# Patient Record
Sex: MALE | Race: WHITE | NOT HISPANIC OR LATINO | URBAN - METROPOLITAN AREA
[De-identification: names, ages, dates, MRNs, and addresses within clinical notes are randomized per-mention and may not be internally consistent; named-entity substitution may affect disease eponyms.]

---

## 2019-02-02 ENCOUNTER — INPATIENT (INPATIENT)
Facility: HOSPITAL | Age: 54
LOS: 0 days | Discharge: HOME | End: 2019-02-03
Attending: FAMILY MEDICINE | Admitting: FAMILY MEDICINE
Payer: COMMERCIAL

## 2019-02-02 VITALS
RESPIRATION RATE: 18 BRPM | DIASTOLIC BLOOD PRESSURE: 79 MMHG | HEIGHT: 70 IN | TEMPERATURE: 98 F | OXYGEN SATURATION: 96 % | WEIGHT: 184.97 LBS | HEART RATE: 69 BPM | SYSTOLIC BLOOD PRESSURE: 109 MMHG

## 2019-02-02 LAB
ALBUMIN SERPL ELPH-MCNC: 3.6 G/DL — SIGNIFICANT CHANGE UP (ref 3.3–5)
ALP SERPL-CCNC: 62 U/L — SIGNIFICANT CHANGE UP (ref 40–120)
ALT FLD-CCNC: 25 U/L — SIGNIFICANT CHANGE UP (ref 12–78)
ANION GAP SERPL CALC-SCNC: 5 MMOL/L — SIGNIFICANT CHANGE UP (ref 5–17)
APTT BLD: 28.9 SEC — SIGNIFICANT CHANGE UP (ref 27.5–36.3)
AST SERPL-CCNC: 22 U/L — SIGNIFICANT CHANGE UP (ref 15–37)
BASOPHILS # BLD AUTO: 0.06 K/UL — SIGNIFICANT CHANGE UP (ref 0–0.2)
BASOPHILS NFR BLD AUTO: 0.9 % — SIGNIFICANT CHANGE UP (ref 0–2)
BILIRUB SERPL-MCNC: 0.4 MG/DL — SIGNIFICANT CHANGE UP (ref 0.2–1.2)
BUN SERPL-MCNC: 24 MG/DL — HIGH (ref 7–23)
CALCIUM SERPL-MCNC: 9.2 MG/DL — SIGNIFICANT CHANGE UP (ref 8.5–10.1)
CHLORIDE SERPL-SCNC: 109 MMOL/L — HIGH (ref 96–108)
CO2 SERPL-SCNC: 25 MMOL/L — SIGNIFICANT CHANGE UP (ref 22–31)
CREAT SERPL-MCNC: 1.52 MG/DL — HIGH (ref 0.5–1.3)
EOSINOPHIL # BLD AUTO: 0.49 K/UL — SIGNIFICANT CHANGE UP (ref 0–0.5)
EOSINOPHIL NFR BLD AUTO: 7.5 % — HIGH (ref 0–6)
GLUCOSE SERPL-MCNC: 100 MG/DL — HIGH (ref 70–99)
HCT VFR BLD CALC: 42 % — SIGNIFICANT CHANGE UP (ref 39–50)
HGB BLD-MCNC: 14.6 G/DL — SIGNIFICANT CHANGE UP (ref 13–17)
IMM GRANULOCYTES NFR BLD AUTO: 0.8 % — SIGNIFICANT CHANGE UP (ref 0–1.5)
INR BLD: 1.04 RATIO — SIGNIFICANT CHANGE UP (ref 0.88–1.16)
LIDOCAIN IGE QN: 231 U/L — SIGNIFICANT CHANGE UP (ref 73–393)
LYMPHOCYTES # BLD AUTO: 1.87 K/UL — SIGNIFICANT CHANGE UP (ref 1–3.3)
LYMPHOCYTES # BLD AUTO: 28.5 % — SIGNIFICANT CHANGE UP (ref 13–44)
MAGNESIUM SERPL-MCNC: 1.9 MG/DL — SIGNIFICANT CHANGE UP (ref 1.6–2.6)
MCHC RBC-ENTMCNC: 31.3 PG — SIGNIFICANT CHANGE UP (ref 27–34)
MCHC RBC-ENTMCNC: 34.8 GM/DL — SIGNIFICANT CHANGE UP (ref 32–36)
MCV RBC AUTO: 90.1 FL — SIGNIFICANT CHANGE UP (ref 80–100)
MONOCYTES # BLD AUTO: 0.66 K/UL — SIGNIFICANT CHANGE UP (ref 0–0.9)
MONOCYTES NFR BLD AUTO: 10.1 % — SIGNIFICANT CHANGE UP (ref 2–14)
NEUTROPHILS # BLD AUTO: 3.43 K/UL — SIGNIFICANT CHANGE UP (ref 1.8–7.4)
NEUTROPHILS NFR BLD AUTO: 52.2 % — SIGNIFICANT CHANGE UP (ref 43–77)
NRBC # BLD: 0 /100 WBCS — SIGNIFICANT CHANGE UP (ref 0–0)
PLATELET # BLD AUTO: 207 K/UL — SIGNIFICANT CHANGE UP (ref 150–400)
POTASSIUM SERPL-MCNC: 3.9 MMOL/L — SIGNIFICANT CHANGE UP (ref 3.5–5.3)
POTASSIUM SERPL-SCNC: 3.9 MMOL/L — SIGNIFICANT CHANGE UP (ref 3.5–5.3)
PROT SERPL-MCNC: 7.3 GM/DL — SIGNIFICANT CHANGE UP (ref 6–8.3)
PROTHROM AB SERPL-ACNC: 11.6 SEC — SIGNIFICANT CHANGE UP (ref 10–12.9)
RBC # BLD: 4.66 M/UL — SIGNIFICANT CHANGE UP (ref 4.2–5.8)
RBC # FLD: 12.2 % — SIGNIFICANT CHANGE UP (ref 10.3–14.5)
SODIUM SERPL-SCNC: 139 MMOL/L — SIGNIFICANT CHANGE UP (ref 135–145)
TROPONIN I SERPL-MCNC: <0.015 NG/ML — SIGNIFICANT CHANGE UP (ref 0.01–0.04)
WBC # BLD: 6.56 K/UL — SIGNIFICANT CHANGE UP (ref 3.8–10.5)
WBC # FLD AUTO: 6.56 K/UL — SIGNIFICANT CHANGE UP (ref 3.8–10.5)

## 2019-02-02 PROCEDURE — 99285 EMERGENCY DEPT VISIT HI MDM: CPT

## 2019-02-02 PROCEDURE — 71045 X-RAY EXAM CHEST 1 VIEW: CPT | Mod: 26

## 2019-02-02 PROCEDURE — 93010 ELECTROCARDIOGRAM REPORT: CPT

## 2019-02-02 PROCEDURE — 71275 CT ANGIOGRAPHY CHEST: CPT | Mod: 26

## 2019-02-02 RX ORDER — SODIUM CHLORIDE 9 MG/ML
1000 INJECTION INTRAMUSCULAR; INTRAVENOUS; SUBCUTANEOUS ONCE
Qty: 0 | Refills: 0 | Status: COMPLETED | OUTPATIENT
Start: 2019-02-02 | End: 2019-02-02

## 2019-02-02 RX ADMIN — SODIUM CHLORIDE 1000 MILLILITER(S): 9 INJECTION INTRAMUSCULAR; INTRAVENOUS; SUBCUTANEOUS at 23:39

## 2019-02-02 RX ADMIN — SODIUM CHLORIDE 1000 MILLILITER(S): 9 INJECTION INTRAMUSCULAR; INTRAVENOUS; SUBCUTANEOUS at 22:38

## 2019-02-02 NOTE — ED PROVIDER NOTE - OBJECTIVE STATEMENT
55 y/o male with a PMHx of acid reflux presents to the ED c/o chest pressure today. Pt reports he was drinking coffee and watching a movie, felt acid reflux symptoms, took an antacids with sx relief. Then, the symptoms came back after 15 minutes and were much worse, with diaphoresis. Pt says "I didn't feel right". EMS gave patient 325 mg PO ASA and Nitro sublingual x 3. Pt reports having similar symptoms 5 years ago. No recent long travel.

## 2019-02-02 NOTE — ED PROVIDER NOTE - MEDICAL DECISION MAKING DETAILS
EKG sinus bradycardia.  CXR WNL.  CTA chest with small pulmonary nodule, otherwise normal.  Initial Troponin undetectable.  Pt heart rate down to mid 40's, which is new for patient.  Is not on any beta-blockade.  Plan for admission, serial enzymes, cardiology consultation.

## 2019-02-02 NOTE — ED ADULT TRIAGE NOTE - CHIEF COMPLAINT QUOTE
Patient states he had 7/10 epigastric CP unrelieved by antacids. EMS gave patient 325 mg PO ASA and Nitro sublingual x 3. Patient states pain improved to 3/10. EKG shown to MD Hirsch.

## 2019-02-02 NOTE — ED ADULT NURSE NOTE - OBJECTIVE STATEMENT
BIBA c/o mid sternal chest pressure & discomfort starting around 1930. Pt reports taking tums with some relief. But chest pressure returned shortly after with some nausea. Denies vomiting. Denies SOB. A&OX4. No distress noted at this time. Has nitro & ASA with EMS prior to ER as per Pt.

## 2019-02-02 NOTE — ED ADULT NURSE NOTE - NSIMPLEMENTINTERV_GEN_ALL_ED
Implemented All Universal Safety Interventions:  [place] to call system. Call bell, personal items and telephone within reach. Instruct patient to call for assistance. Room bathroom lighting operational. Non-slip footwear when patient is off stretcher. Physically safe environment: no spills, clutter or unnecessary equipment. Stretcher in lowest position, wheels locked, appropriate side rails in place.

## 2019-02-03 ENCOUNTER — TRANSCRIPTION ENCOUNTER (OUTPATIENT)
Age: 54
End: 2019-02-03

## 2019-02-03 VITALS
SYSTOLIC BLOOD PRESSURE: 120 MMHG | HEART RATE: 76 BPM | OXYGEN SATURATION: 97 % | RESPIRATION RATE: 17 BRPM | DIASTOLIC BLOOD PRESSURE: 61 MMHG | TEMPERATURE: 97 F

## 2019-02-03 LAB
ADD ON TEST-SPECIMEN IN LAB: SIGNIFICANT CHANGE UP
ANION GAP SERPL CALC-SCNC: 4 MMOL/L — LOW (ref 5–17)
BUN SERPL-MCNC: 21 MG/DL — SIGNIFICANT CHANGE UP (ref 7–23)
CALCIUM SERPL-MCNC: 8.9 MG/DL — SIGNIFICANT CHANGE UP (ref 8.5–10.1)
CHLORIDE SERPL-SCNC: 112 MMOL/L — HIGH (ref 96–108)
CHOLEST SERPL-MCNC: 133 MG/DL — SIGNIFICANT CHANGE UP (ref 10–199)
CO2 SERPL-SCNC: 29 MMOL/L — SIGNIFICANT CHANGE UP (ref 22–31)
CREAT SERPL-MCNC: 1.48 MG/DL — HIGH (ref 0.5–1.3)
GLUCOSE SERPL-MCNC: 78 MG/DL — SIGNIFICANT CHANGE UP (ref 70–99)
HCV AB S/CO SERPL IA: 0.15 S/CO — SIGNIFICANT CHANGE UP
HCV AB SERPL-IMP: SIGNIFICANT CHANGE UP
HDLC SERPL-MCNC: 49 MG/DL — SIGNIFICANT CHANGE UP
LIPID PNL WITH DIRECT LDL SERPL: 72 MG/DL — SIGNIFICANT CHANGE UP
POTASSIUM SERPL-MCNC: 4.6 MMOL/L — SIGNIFICANT CHANGE UP (ref 3.5–5.3)
POTASSIUM SERPL-SCNC: 4.6 MMOL/L — SIGNIFICANT CHANGE UP (ref 3.5–5.3)
SODIUM SERPL-SCNC: 145 MMOL/L — SIGNIFICANT CHANGE UP (ref 135–145)
TOTAL CHOLESTEROL/HDL RATIO MEASUREMENT: 2.7 RATIO — LOW (ref 3.4–9.6)
TRIGL SERPL-MCNC: 60 MG/DL — SIGNIFICANT CHANGE UP (ref 10–149)
TROPONIN I SERPL-MCNC: <0.015 NG/ML — SIGNIFICANT CHANGE UP (ref 0.01–0.04)
TROPONIN I SERPL-MCNC: <0.015 NG/ML — SIGNIFICANT CHANGE UP (ref 0.01–0.04)
TSH SERPL-MCNC: 1.72 UU/ML — SIGNIFICANT CHANGE UP (ref 0.34–4.82)

## 2019-02-03 PROCEDURE — 93010 ELECTROCARDIOGRAM REPORT: CPT

## 2019-02-03 RX ORDER — FAMOTIDINE 10 MG/ML
1 INJECTION INTRAVENOUS
Qty: 60 | Refills: 0
Start: 2019-02-03 | End: 2019-03-04

## 2019-02-03 RX ORDER — ASPIRIN/CALCIUM CARB/MAGNESIUM 324 MG
325 TABLET ORAL DAILY
Qty: 0 | Refills: 0 | Status: DISCONTINUED | OUTPATIENT
Start: 2019-02-03 | End: 2019-02-03

## 2019-02-03 RX ORDER — METOPROLOL TARTRATE 50 MG
0.5 TABLET ORAL
Qty: 15 | Refills: 0
Start: 2019-02-03 | End: 2019-03-04

## 2019-02-03 RX ORDER — ASPIRIN/CALCIUM CARB/MAGNESIUM 324 MG
1 TABLET ORAL
Qty: 30 | Refills: 0
Start: 2019-02-03 | End: 2019-03-04

## 2019-02-03 RX ORDER — ACETAMINOPHEN 500 MG
650 TABLET ORAL EVERY 6 HOURS
Qty: 0 | Refills: 0 | Status: DISCONTINUED | OUTPATIENT
Start: 2019-02-03 | End: 2019-02-03

## 2019-02-03 RX ORDER — ONDANSETRON 8 MG/1
4 TABLET, FILM COATED ORAL EVERY 6 HOURS
Qty: 0 | Refills: 0 | Status: DISCONTINUED | OUTPATIENT
Start: 2019-02-03 | End: 2019-02-03

## 2019-02-03 RX ORDER — SODIUM CHLORIDE 9 MG/ML
3 INJECTION INTRAMUSCULAR; INTRAVENOUS; SUBCUTANEOUS ONCE
Qty: 0 | Refills: 0 | Status: COMPLETED | OUTPATIENT
Start: 2019-02-03 | End: 2019-02-03

## 2019-02-03 RX ORDER — ATORVASTATIN CALCIUM 80 MG/1
20 TABLET, FILM COATED ORAL AT BEDTIME
Qty: 0 | Refills: 0 | Status: DISCONTINUED | OUTPATIENT
Start: 2019-02-03 | End: 2019-02-03

## 2019-02-03 RX ORDER — ATORVASTATIN CALCIUM 80 MG/1
1 TABLET, FILM COATED ORAL
Qty: 30 | Refills: 0
Start: 2019-02-03 | End: 2019-03-04

## 2019-02-03 RX ORDER — ENOXAPARIN SODIUM 100 MG/ML
40 INJECTION SUBCUTANEOUS EVERY 24 HOURS
Qty: 0 | Refills: 0 | Status: DISCONTINUED | OUTPATIENT
Start: 2019-02-03 | End: 2019-02-03

## 2019-02-03 RX ADMIN — Medication 325 MILLIGRAM(S): at 11:41

## 2019-02-03 RX ADMIN — SODIUM CHLORIDE 3 MILLILITER(S): 9 INJECTION INTRAMUSCULAR; INTRAVENOUS; SUBCUTANEOUS at 00:27

## 2019-02-03 RX ADMIN — ENOXAPARIN SODIUM 40 MILLIGRAM(S): 100 INJECTION SUBCUTANEOUS at 05:36

## 2019-02-03 NOTE — DISCHARGE NOTE ADULT - PATIENT PORTAL LINK FT
You can access the [facility] Portal, offered by [facility], by registering with the following website: [email or web address]

## 2019-02-03 NOTE — DISCHARGE NOTE ADULT - MEDICATION SUMMARY - MEDICATIONS TO TAKE
I will START or STAY ON the medications listed below when I get home from the hospital:    aspirin 81 mg oral tablet  -- 1 tab(s) by mouth once a day  -- Indication: For Chest pain    atorvastatin 20 mg oral tablet  -- 1 tab(s) by mouth once a day (at bedtime)  -- Indication: For Chest pain    Metoprolol Succinate ER 25 mg oral tablet, extended release  -- 0.5 tab(s) by mouth once a day   -- It is very important that you take or use this exactly as directed.  Do not skip doses or discontinue unless directed by your doctor.  May cause drowsiness.  Alcohol may intensify this effect.  Use care when operating dangerous machinery.  Some non-prescription drugs may aggravate your condition.  Read all labels carefully.  If a warning appears, check with your doctor before taking.  Swallow whole.  Do not crush.  Take with food or milk.  This drug may impair the ability to drive or operate machinery.  Use care until you become familiar with its effects.    -- Indication: For Chest pain    Pepcid 20 mg oral tablet  -- 1 tab(s) by mouth 2 times a day   -- It is very important that you take or use this exactly as directed.  Do not skip doses or discontinue unless directed by your doctor.  Obtain medical advice before taking any non-prescription drugs as some may affect the action of this medication.    -- Indication: For gerd

## 2019-02-03 NOTE — H&P ADULT - HISTORY OF PRESENT ILLNESS
53 y/o male with a PMHx of acid reflux presents to the ED c/o chest pressure today. Pt reports he was drinking coffee and watching a movie, felt acid reflux symptoms, took an antacids with sx relief. Then, the symptoms came back after 15 minutes and were much worse, with diaphoresis. Pt says "I didn't feel right". EMS gave patient 325 mg PO ASA and Nitro sublingual x 3. Pt reports having similar symptoms 5 years ago. No recent long travel. Pt is a  53 y/o male with a PMHx of acid reflux  who presents via EMS to the ED c/o chest pressure today. Pt reports he was drinking coffee and watching a movie, felt acid reflux symptoms, took an antacid with some relief.    Then, the symptoms came back after 15 minutes and were much worse, with diaphoresis.  Pt reports having similar symptoms 5 years ago.    Pt said to ED staff,  "I didn't feel right".      EMS gave patient 325 mg PO ASA and Nitro sublingual x 3,  following this pts blood pressure went down to 100s systolic.  In the ED , pt was noted to be bradycardic to 45 on telemetry. Pt is a  55 y/o gentleman with  PMHx of acid reflux, partial parathyroidectomy for nephrolithiasis due to hypercalcemia, remote food borne Hepatitis infection  who presents via EMS c/o chest pressure while watching a movie and sipping coffee at 8pm.    Pt reports he initially  felt acid reflux symptoms, and took a Tagament antacid, then took Tums  with some relief.    He felt the symptoms came back after 15 minutes and increased from 5/10 to 9/10, with diaphoresis and a feeling of discomfort. ( Pt reports having similar symptoms 5 years ago,  at which time he had a neg nuclear stress test).  Pt took 4 baby ASA.  He looked grey and his systolic bp was 160s.   Once EMS arrived they gave sublingual nitroglycerin  x 3.   Following this,  pt reports his chest pressure improved and is now 1/10.   Pts blood pressure went down to 90s systolic.  In the ED , pt was noted to be bradycardic to 45 on telemetry.     Pt reports he drinks 4-5 cups of coffee/day    Fam Hx:  brother DM at 58  Father DM, Parkinsons, Bypass at age 63,  at 88 of lung ca  Mother is 82

## 2019-02-03 NOTE — DISCHARGE NOTE ADULT - CARE PROVIDERS DIRECT ADDRESSES
,DirectAddress_Unknown,[email or web address]

## 2019-02-03 NOTE — DISCHARGE NOTE ADULT - CARE PROVIDER_API CALL
Raquel Lange (MD)  Cardiology; Interventional Cardiology  [address] [address] [facility] [address], [place] [address]  Phone: [phone]  Fax: [phone]    Oleg Stein)  Internal Medicine  [address] [place] [address]  Phone: [phone]  Fax: [phone]

## 2019-02-03 NOTE — H&P ADULT - NSHPPHYSICALEXAM_GEN_ALL_CORE
ICU Vital Signs Last 24 Hrs    T(F): 98.2 (02 Feb 2019 22:37), Max: 98.5 (02 Feb 2019 20:45)  HR: 51 (02 Feb 2019 22:37) (51 - 69)  BP: 105/64 (02 Feb 2019 22:37) (105/64 - 109/79)    RR: 14 (02 Feb 2019 22:37) (14 - 18)  SpO2: 97% (02 Feb 2019 22:37) (96% - 97%)

## 2019-02-03 NOTE — DISCHARGE NOTE ADULT - PLAN OF CARE
resolution -Continue ASA, metoprolol, statin  -Follow up with Cardiology in AM -Pepcid 20mg twice a day -Incidental finding 4mm nodule in Right upper lobe on CT scan  -Recommend repeat CT scan in 1 year  -Follow up with PCP

## 2019-02-03 NOTE — DISCHARGE NOTE ADULT - CARE PLAN
Principal Discharge DX:	Chest pain  Goal:	resolution  Assessment and plan of treatment:	-Continue ASA, metoprolol, statin  -Follow up with Cardiology in AM  Secondary Diagnosis:	Acid reflux  Assessment and plan of treatment:	-Pepcid 20mg twice a day  Secondary Diagnosis:	Pulmonary nodule  Assessment and plan of treatment:	-Incidental finding 4mm nodule in Right upper lobe on CT scan  -Recommend repeat CT scan in 1 year  -Follow up with PCP

## 2019-02-03 NOTE — H&P ADULT - ASSESSMENT
Pt is admitted w/    I. Chest pressure  - adm to telemetry  - s/p ASA, add statin  - No B blocker due to bradycardia  - repeat EKG and troponins  - cardiology consult    II. Bradycardia, not on B blocker  - telemetry  - TSH    III. DVT prophylaxis  - heparin    IV. IMPROVE VTE Individual Risk Assessment    RISK                                                                Points    [  ] Previous VTE                                                  3    [  ] Thrombophilia                                               2    [  ] Lower limb paralysis                                      2        (unable to hold up >15 seconds)      [  ] Current Cancer                                              2         (within 6 months)    [  ] Immobilization > 24 hrs                                1    [  ] ICU/CCU stay > 24 hours                              1    [  ] Age > 60                                                      1    IMPROVE VTE Score ____0_____ Pt is a  55 y/o male with a PMHx of acid reflux  who presents via EMS to the ED c/o chest pressure today. Pt reports he was drinking coffee and watching a movie, felt acid reflux symptoms, took an antacid with some relief.    Then, the symptoms came back after 15 minutes and were much worse, with diaphoresis.  Pt reports having similar symptoms 5 years ago.    Pt said to ED staff,  "I didn't feel right".      EMS gave patient 325 mg PO ASA and Nitro sublingual x 3,  following this pts blood pressure went down to 100s systolic.  In the ED , pt was noted to be bradycardic to 45 on telemetry.     Pt is admitted w/    I. Chest pressure  - adm to telemetry  - s/p ASA, add statin  - No B blocker due to bradycardia  - repeat EKG and troponins  - cardiology consult    II. Bradycardia, not on outpatient B blocker  - telemetry  - TSH    III. DVT prophylaxis  - heparin    IV. IMPROVE VTE Individual Risk Assessment    RISK                                                                Points    [  ] Previous VTE                                                  3    [  ] Thrombophilia                                               2    [  ] Lower limb paralysis                                      2        (unable to hold up >15 seconds)      [  ] Current Cancer                                              2         (within 6 months)    [  ] Immobilization > 24 hrs                                1    [  ] ICU/CCU stay > 24 hours                              1    [  ] Age > 60                                                      1    IMPROVE VTE Score ____0_____ Pt is a  53 y/o gentleman with  PMHx of acid reflux, partial parathyroidectomy for nephrolithiasis due to hypercalcemia, remote food borne Hepatitis infection  who presents via EMS c/o chest pressure while watching a movie and sipping coffee at 8pm.    Pt reports he initially  felt acid reflux symptoms, and took a Tagament antacid, then took Tums  with some relief.    He felt the symptoms came back after 15 minutes and increased from 5/10 to 9/10, with diaphoresis and a feeling of discomfort. ( Pt reports having similar symptoms 5 years ago,  at which time he had a neg nuclear stress test).  Pt took 4 baby ASA.  He looked grey and his systolic bp was 160s.   Once EMS arrived they gave sublingual nitroglycerin  x 3.   Following this,  pt reports his chest pressure improved and is now 1/10.   Pts blood pressure went down to 90s systolic.  In the ED , pt was noted to be bradycardic to 45 on telemetry.  He reports to me 1-2/10 chest discomfort now,       Pt is admitted w/    I. Chest pressure  DDX GERD  - adm to telemetry  - s/p ASA, add statin  - No B blocker due to bradycardia  - repeat EKG and troponins  - cardiology consult Dr. Lange    II. Bradycardia, not on outpatient B blocker  - telemetry  - TSH    III.GERD  Reduce coffee intake  - needs outpt f/u with GI    IV.  DVT prophylaxis  - heparin      V. IMPROVE VTE Individual Risk Assessment    RISK                                                                Points    [  ] Previous VTE                                                  3    [  ] Thrombophilia                                               2    [  ] Lower limb paralysis                                      2        (unable to hold up >15 seconds)      [  ] Current Cancer                                              2         (within 6 months)    [  ] Immobilization > 24 hrs                                1    [  ] ICU/CCU stay > 24 hours                              1    [  ] Age > 60                                                      1    IMPROVE VTE Score ____0_____

## 2019-02-03 NOTE — DISCHARGE NOTE ADULT - HOSPITAL COURSE
Pt is a  53 y/o gentleman with  PMHx of acid reflux, partial parathyroidectomy for nephrolithiasis due to hypercalcemia, remote food borne Hepatitis infection  who presents via EMS c/o chest pressure while watching a movie and sipping coffee at 8pm.    Pt reports he initially  felt acid reflux symptoms, and took a Tagament antacid, then took Tums  with some relief.    He felt the symptoms came back after 15 minutes and increased from 5/10 to 9/10, with diaphoresis and a feeling of discomfort. ( Pt reports having similar symptoms 5 years ago,  at which time he had a neg nuclear stress test).  Pt took 4 baby ASA.  He looked grey and his systolic bp was 160s.   Once EMS arrived they gave sublingual nitroglycerin  x 3.   Following this,  pt reports his chest pressure improved and is now 1/10.   Pts blood pressure went down to 90s systolic.  In the ED , pt was noted to be bradycardic to 45 on telemetry.     Pt reports he drinks 4-5 cups of coffee/day    2/3/19- Patient seen and examined at bedside. States he feels well, states chest pressure has mostly resolved, now just resembles his prior GERD symptoms. Case discussed with Cardio, as patient with improvement in symptoms, EKG with no acute changes, patient can be discharged home with ASA, B-blocker and statin and follow up with Cardio in AM for further testing.    Physical Exam:  General: Well developed, well nourished, NAD  HEENT: NCAT, PERRL, EOMI bl, moist mucous membranes   Neurology: A&Ox3, nonfocal, CN II-XII grossly intact, sensation intact  Respiratory: CTA B/L, No W/R/R  CV: RRR, +S1/S2, no murmurs, rubs or gallops  Abdominal: Soft, NT, ND +BSx4  Extremities: No C/C/E, + peripheral pulses   Skin: warm, dry    #Chest pressure  DDX GERD  - s/p ASA, add statin  - repeat EKG and troponins- negative  - cardiology consult Dr. Lange    #Bradycardia, not on outpatient B blocker  - telemetry  - TSH- normal  - Spoke with Cardio, can give low dose B-blocker until evaluated further by Cardiology    #GERD  Reduce coffee intake  - needs outpt f/u with GI    Total time spent on discharge: 37 minutes

## 2019-02-06 DIAGNOSIS — R00.1 BRADYCARDIA, UNSPECIFIED: ICD-10-CM

## 2019-02-06 DIAGNOSIS — E89.2 POSTPROCEDURAL HYPOPARATHYROIDISM: ICD-10-CM

## 2019-02-06 DIAGNOSIS — Z87.442 PERSONAL HISTORY OF URINARY CALCULI: ICD-10-CM

## 2019-02-06 DIAGNOSIS — R91.1 SOLITARY PULMONARY NODULE: ICD-10-CM

## 2019-02-06 DIAGNOSIS — R07.9 CHEST PAIN, UNSPECIFIED: ICD-10-CM

## 2019-02-06 DIAGNOSIS — Z87.891 PERSONAL HISTORY OF NICOTINE DEPENDENCE: ICD-10-CM

## 2019-02-06 DIAGNOSIS — Z86.19 PERSONAL HISTORY OF OTHER INFECTIOUS AND PARASITIC DISEASES: ICD-10-CM

## 2019-02-06 DIAGNOSIS — K21.9 GASTRO-ESOPHAGEAL REFLUX DISEASE WITHOUT ESOPHAGITIS: ICD-10-CM

## 2019-11-25 ENCOUNTER — TRANSCRIPTION ENCOUNTER (OUTPATIENT)
Age: 54
End: 2019-11-25

## 2022-01-26 ENCOUNTER — APPOINTMENT (OUTPATIENT)
Dept: ENDOCRINOLOGY | Facility: CLINIC | Age: 57
End: 2022-01-26

## 2022-01-26 NOTE — HISTORY OF PRESENT ILLNESS
[FreeTextEntry1] : Mr. STRAUBER  is a 57 year old  male  who presents for initial endocrine evaluation. He presents with regard to a history of \par \par  Additional medical history includes that of\par

## 2022-07-18 ENCOUNTER — INPATIENT (INPATIENT)
Facility: HOSPITAL | Age: 57
LOS: 2 days | Discharge: HOME | DRG: 41 | End: 2022-07-21
Attending: FAMILY MEDICINE | Admitting: FAMILY MEDICINE
Payer: COMMERCIAL

## 2022-07-18 VITALS — HEIGHT: 70 IN

## 2022-07-18 DIAGNOSIS — I63.9 CEREBRAL INFARCTION, UNSPECIFIED: ICD-10-CM

## 2022-07-18 LAB
ALBUMIN SERPL ELPH-MCNC: 3.9 G/DL — SIGNIFICANT CHANGE UP (ref 3.3–5)
ALP SERPL-CCNC: 83 U/L — SIGNIFICANT CHANGE UP (ref 40–120)
ALT FLD-CCNC: 36 U/L — SIGNIFICANT CHANGE UP (ref 12–78)
ANION GAP SERPL CALC-SCNC: 4 MMOL/L — LOW (ref 5–17)
APTT BLD: 31 SEC — SIGNIFICANT CHANGE UP (ref 27.5–35.5)
AST SERPL-CCNC: 24 U/L — SIGNIFICANT CHANGE UP (ref 15–37)
BASOPHILS # BLD AUTO: 0.04 K/UL — SIGNIFICANT CHANGE UP (ref 0–0.2)
BASOPHILS NFR BLD AUTO: 0.5 % — SIGNIFICANT CHANGE UP (ref 0–2)
BILIRUB SERPL-MCNC: 0.4 MG/DL — SIGNIFICANT CHANGE UP (ref 0.2–1.2)
BUN SERPL-MCNC: 19 MG/DL — SIGNIFICANT CHANGE UP (ref 7–23)
CALCIUM SERPL-MCNC: 9.7 MG/DL — SIGNIFICANT CHANGE UP (ref 8.5–10.1)
CHLORIDE SERPL-SCNC: 110 MMOL/L — HIGH (ref 96–108)
CK SERPL-CCNC: 101 U/L — SIGNIFICANT CHANGE UP (ref 26–308)
CO2 SERPL-SCNC: 26 MMOL/L — SIGNIFICANT CHANGE UP (ref 22–31)
CREAT SERPL-MCNC: 1.34 MG/DL — HIGH (ref 0.5–1.3)
EGFR: 61 ML/MIN/1.73M2 — SIGNIFICANT CHANGE UP
EOSINOPHIL # BLD AUTO: 0.32 K/UL — SIGNIFICANT CHANGE UP (ref 0–0.5)
EOSINOPHIL NFR BLD AUTO: 4.1 % — SIGNIFICANT CHANGE UP (ref 0–6)
GLUCOSE SERPL-MCNC: 90 MG/DL — SIGNIFICANT CHANGE UP (ref 70–99)
HCT VFR BLD CALC: 44.3 % — SIGNIFICANT CHANGE UP (ref 39–50)
HGB BLD-MCNC: 15.1 G/DL — SIGNIFICANT CHANGE UP (ref 13–17)
IMM GRANULOCYTES NFR BLD AUTO: 0.6 % — SIGNIFICANT CHANGE UP (ref 0–1.5)
INR BLD: 1.09 RATIO — SIGNIFICANT CHANGE UP (ref 0.88–1.16)
LYMPHOCYTES # BLD AUTO: 1.57 K/UL — SIGNIFICANT CHANGE UP (ref 1–3.3)
LYMPHOCYTES # BLD AUTO: 20.1 % — SIGNIFICANT CHANGE UP (ref 13–44)
MCHC RBC-ENTMCNC: 31.5 PG — SIGNIFICANT CHANGE UP (ref 27–34)
MCHC RBC-ENTMCNC: 34.1 GM/DL — SIGNIFICANT CHANGE UP (ref 32–36)
MCV RBC AUTO: 92.5 FL — SIGNIFICANT CHANGE UP (ref 80–100)
MONOCYTES # BLD AUTO: 0.76 K/UL — SIGNIFICANT CHANGE UP (ref 0–0.9)
MONOCYTES NFR BLD AUTO: 9.7 % — SIGNIFICANT CHANGE UP (ref 2–14)
NEUTROPHILS # BLD AUTO: 5.06 K/UL — SIGNIFICANT CHANGE UP (ref 1.8–7.4)
NEUTROPHILS NFR BLD AUTO: 65 % — SIGNIFICANT CHANGE UP (ref 43–77)
PLATELET # BLD AUTO: 231 K/UL — SIGNIFICANT CHANGE UP (ref 150–400)
POTASSIUM SERPL-MCNC: 4.2 MMOL/L — SIGNIFICANT CHANGE UP (ref 3.5–5.3)
POTASSIUM SERPL-SCNC: 4.2 MMOL/L — SIGNIFICANT CHANGE UP (ref 3.5–5.3)
PROT SERPL-MCNC: 7.8 GM/DL — SIGNIFICANT CHANGE UP (ref 6–8.3)
PROTHROM AB SERPL-ACNC: 12.6 SEC — SIGNIFICANT CHANGE UP (ref 10.5–13.4)
RBC # BLD: 4.79 M/UL — SIGNIFICANT CHANGE UP (ref 4.2–5.8)
RBC # FLD: 12.3 % — SIGNIFICANT CHANGE UP (ref 10.3–14.5)
SODIUM SERPL-SCNC: 140 MMOL/L — SIGNIFICANT CHANGE UP (ref 135–145)
TROPONIN I, HIGH SENSITIVITY RESULT: <3 NG/L — SIGNIFICANT CHANGE UP
WBC # BLD: 7.8 K/UL — SIGNIFICANT CHANGE UP (ref 3.8–10.5)
WBC # FLD AUTO: 7.8 K/UL — SIGNIFICANT CHANGE UP (ref 3.8–10.5)

## 2022-07-18 PROCEDURE — 80048 BASIC METABOLIC PNL TOTAL CA: CPT

## 2022-07-18 PROCEDURE — C1764: CPT

## 2022-07-18 PROCEDURE — 84443 ASSAY THYROID STIM HORMONE: CPT

## 2022-07-18 PROCEDURE — 70498 CT ANGIOGRAPHY NECK: CPT | Mod: 26,MA

## 2022-07-18 PROCEDURE — 83036 HEMOGLOBIN GLYCOSYLATED A1C: CPT

## 2022-07-18 PROCEDURE — 36415 COLL VENOUS BLD VENIPUNCTURE: CPT

## 2022-07-18 PROCEDURE — 70551 MRI BRAIN STEM W/O DYE: CPT

## 2022-07-18 PROCEDURE — 93010 ELECTROCARDIOGRAM REPORT: CPT

## 2022-07-18 PROCEDURE — 93005 ELECTROCARDIOGRAM TRACING: CPT

## 2022-07-18 PROCEDURE — 85652 RBC SED RATE AUTOMATED: CPT

## 2022-07-18 PROCEDURE — 86140 C-REACTIVE PROTEIN: CPT

## 2022-07-18 PROCEDURE — 99223 1ST HOSP IP/OBS HIGH 75: CPT

## 2022-07-18 PROCEDURE — 80061 LIPID PANEL: CPT

## 2022-07-18 PROCEDURE — 0042T: CPT | Mod: MA

## 2022-07-18 PROCEDURE — 93306 TTE W/DOPPLER COMPLETE: CPT

## 2022-07-18 PROCEDURE — 92523 SPEECH SOUND LANG COMPREHEN: CPT | Mod: GN

## 2022-07-18 PROCEDURE — 33285 INSJ SUBQ CAR RHYTHM MNTR: CPT

## 2022-07-18 PROCEDURE — 97116 GAIT TRAINING THERAPY: CPT | Mod: GP

## 2022-07-18 PROCEDURE — 82962 GLUCOSE BLOOD TEST: CPT

## 2022-07-18 PROCEDURE — 70496 CT ANGIOGRAPHY HEAD: CPT | Mod: 26,MA

## 2022-07-18 PROCEDURE — 99291 CRITICAL CARE FIRST HOUR: CPT

## 2022-07-18 PROCEDURE — 97162 PT EVAL MOD COMPLEX 30 MIN: CPT | Mod: GP

## 2022-07-18 PROCEDURE — 85027 COMPLETE CBC AUTOMATED: CPT

## 2022-07-18 PROCEDURE — 92610 EVALUATE SWALLOWING FUNCTION: CPT | Mod: GN

## 2022-07-18 PROCEDURE — 71045 X-RAY EXAM CHEST 1 VIEW: CPT | Mod: 26

## 2022-07-18 RX ORDER — PSEUDOEPHEDRINE HCL 120 MG
1 TABLET, EXTENDED RELEASE ORAL
Qty: 0 | Refills: 0 | DISCHARGE

## 2022-07-18 RX ORDER — ASPIRIN/CALCIUM CARB/MAGNESIUM 324 MG
81 TABLET ORAL DAILY
Refills: 0 | Status: DISCONTINUED | OUTPATIENT
Start: 2022-07-18 | End: 2022-07-18

## 2022-07-18 RX ORDER — LANOLIN ALCOHOL/MO/W.PET/CERES
1 CREAM (GRAM) TOPICAL
Qty: 0 | Refills: 0 | DISCHARGE

## 2022-07-18 RX ORDER — ATORVASTATIN CALCIUM 80 MG/1
80 TABLET, FILM COATED ORAL AT BEDTIME
Refills: 0 | Status: DISCONTINUED | OUTPATIENT
Start: 2022-07-18 | End: 2022-07-21

## 2022-07-18 RX ORDER — ONDANSETRON 8 MG/1
4 TABLET, FILM COATED ORAL EVERY 8 HOURS
Refills: 0 | Status: DISCONTINUED | OUTPATIENT
Start: 2022-07-18 | End: 2022-07-21

## 2022-07-18 RX ORDER — CLOPIDOGREL BISULFATE 75 MG/1
75 TABLET, FILM COATED ORAL ONCE
Refills: 0 | Status: COMPLETED | OUTPATIENT
Start: 2022-07-18 | End: 2022-07-18

## 2022-07-18 RX ORDER — CLOPIDOGREL BISULFATE 75 MG/1
75 TABLET, FILM COATED ORAL DAILY
Refills: 0 | Status: DISCONTINUED | OUTPATIENT
Start: 2022-07-19 | End: 2022-07-21

## 2022-07-18 RX ORDER — ACETAMINOPHEN 500 MG
650 TABLET ORAL EVERY 6 HOURS
Refills: 0 | Status: DISCONTINUED | OUTPATIENT
Start: 2022-07-18 | End: 2022-07-21

## 2022-07-18 RX ORDER — ASPIRIN/CALCIUM CARB/MAGNESIUM 324 MG
81 TABLET ORAL DAILY
Refills: 0 | Status: DISCONTINUED | OUTPATIENT
Start: 2022-07-19 | End: 2022-07-21

## 2022-07-18 RX ORDER — HEPARIN SODIUM 5000 [USP'U]/ML
5000 INJECTION INTRAVENOUS; SUBCUTANEOUS EVERY 12 HOURS
Refills: 0 | Status: COMPLETED | OUTPATIENT
Start: 2022-07-18 | End: 2022-07-20

## 2022-07-18 RX ORDER — ASPIRIN/CALCIUM CARB/MAGNESIUM 324 MG
324 TABLET ORAL ONCE
Refills: 0 | Status: COMPLETED | OUTPATIENT
Start: 2022-07-18 | End: 2022-07-18

## 2022-07-18 RX ORDER — LANOLIN ALCOHOL/MO/W.PET/CERES
3 CREAM (GRAM) TOPICAL AT BEDTIME
Refills: 0 | Status: DISCONTINUED | OUTPATIENT
Start: 2022-07-18 | End: 2022-07-21

## 2022-07-18 RX ADMIN — Medication 324 MILLIGRAM(S): at 23:28

## 2022-07-18 RX ADMIN — HEPARIN SODIUM 5000 UNIT(S): 5000 INJECTION INTRAVENOUS; SUBCUTANEOUS at 23:33

## 2022-07-18 RX ADMIN — Medication 81 MILLIGRAM(S): at 19:41

## 2022-07-18 RX ADMIN — CLOPIDOGREL BISULFATE 75 MILLIGRAM(S): 75 TABLET, FILM COATED ORAL at 19:41

## 2022-07-18 RX ADMIN — ATORVASTATIN CALCIUM 80 MILLIGRAM(S): 80 TABLET, FILM COATED ORAL at 23:33

## 2022-07-18 NOTE — ED PROVIDER NOTE - OBJECTIVE STATEMENT
58 y/o male presents to the ED c/o code stroke. As per daughter, pt was trouble speaking 8pm last night with difficulty talking R lower face droop, went to work came home crooked smile back, speech worsening, urgent care sent to ER. 56 y/o male presents to the ED c/o code stroke. As per wife, pt was trouble speaking 8pm last night with R lower face droop, but daughter did not think much of it. Today when pt went to work and came home, daughter noticed facial droop, speech worsening, slurred speech, urgent care sent to ER.

## 2022-07-18 NOTE — ED PROVIDER NOTE - PROGRESS NOTE DETAILS
Kelly Page for attending Dr. Oliveira:  On phone with telestroke, attempting to speak with  CT reading shows acute CVA. Kelly Page for attending Dr. Momin:  On phone with telestroke, attempting to speak with  CT reading shows acute CVA. Kelly Page for attending Dr. Momin: Discussed with Dr. Matias telestroke  who asks for aspirin Plavix, and admission no need for transfer as pt is not a TPA candidate.

## 2022-07-18 NOTE — ED PROVIDER NOTE - NS ED ROS FT
Constitutional: No fever or chills  Eyes: No visual changes  HEENT: No throat pain  CV: No chest pain  Resp: No SOB no cough  GI: No abd pain, nausea or vomiting  : No dysuria  MSK: No musculoskeletal pain  Skin: No rash  Neuro: No headache, +difficulty speaking, +R lower facial droop. Constitutional: No fever or chills  Eyes: No visual changes  HEENT: No throat pain  CV: No chest pain  Resp: No SOB no cough  GI: No abd pain, nausea or vomiting  : No dysuria  MSK: No musculoskeletal pain  Skin: No rash  Neuro: No headache, +difficulty speaking, +R lower facial droop. ,+slurred speech

## 2022-07-18 NOTE — ED PROVIDER NOTE - PHYSICAL EXAMINATION
Constitutional: NAD AAOx3  Eyes: PERRLA EOMI  Head: Normocephalic atraumatic  Mouth: MMM  Cardiac: regular rate   Resp: Lungs CTAB  GI: Abd s/nt/nd, no rebound or guarding.  Neuro: awake, alert, moving all extremities, cranial nerves 2-12 intact, sensation intact, no dysmetria. See stroke scale.  Skin: No rashes Constitutional: NAD AAOx3  Eyes: PERRLA EOMI  Head: Normocephalic atraumatic  Mouth: MMM  Cardiac: regular rate   Resp: Lungs CTAB  GI: Abd s/nt/nd, no rebound or guarding.  Neuro: awake, alert, moving all extremities, cranial nerves 2-12 intact, sensation intact, no dysmetria. NIH = 1 See stroke scale.  Skin: No rashes

## 2022-07-18 NOTE — ED PROVIDER NOTE - CHPI ED SYMPTOMS POS
+R lower face droop, +difficulty speaking +R lower face droop, +difficulty speaking, +slurred speech

## 2022-07-18 NOTE — ED ADULT NURSE NOTE - OBJECTIVE STATEMENT
Assumed care of Pt from TRENA Gardner. Pt received resting comfortably in stretcher. Vital signs reassessed and stable at this time. Dysphagia screen performed, negative findings. NIH stroke scale performed, negative findings. Per Pt's wife, Pt's thought process appears altered. Administered plavix and aspirin as per order. Pt placed on cardiac monitor. EKG completed. Pt to be admitted.     As per TRENA Gardner, Pt was seen through pivot/intake complaining of facial droop beginning yesterday. Pt is not a candidate for TPA.     Pt is ambulatory at home. A&O x4. Airway is patent, breathing is even and unlabored. Pt denies difficulty breathing and shortness of breath. Skin is warm and dry. PMS x4 extremities. Pt denies chest pain. Pt denies numbness and tingling in arms and legs. Pt denies headaches and blurred vision. Pt denies abdominal discomfort and n/v/d. Line and labbed by TRENA Gardner. Pt to be admitted. Pending COVID swab results to RTM. No additional requests or complaints. Patient safety maintained. Will continue to monitor.

## 2022-07-18 NOTE — H&P ADULT - NSHPREVIEWOFSYSTEMS_GEN_ALL_CORE
Constitutional: negative for fatigue, negative for fever, negative for chills, negative for decreased appetite.  Skin: negative for rashes, negative for open wounds, negative for jaundice.   Eyes: negative for blurry vision, negative for double vision.   Ears, nose, throat: negative for ear pain, negative for nasal congestion, negative for sore throat, negative for lymph node swelling.   Cardiovascular: negative for chest pain, negative for palpitations, negative for lower extremity swelling.   Respiratory: negative for shortness of breath, negative for wheezing, negative for cough.   Gastrointestinal: negative for abdominal pain, negative for nausea, negative for vomiting, negative for diarrhea, negative for constipation, negative for blood in the stool, negative for black tarry stools.   Genitourinary: negative for burning on urination, negative for urinary urgency or frequency, negative for blood in the urine.   Endocrine: negative for cold intolerance, negative for heat intolerance, negative for increased thirst.   Hematologic: negative for easy bruising or bleeding.   Musculoskeletal: negative for muscle/joint pain, negative for decreased range of motion.   Neurological: negative for dizziness, negative for headaches, negative for loss of consciousness, negative for motor weakness, negative for sensory deficits, difficulty with word finding, facial droop.   Psychiatric: negative for depression, negative for anxiety.

## 2022-07-18 NOTE — ED PROVIDER NOTE - CLINICAL SUMMARY MEDICAL DECISION MAKING FREE TEXT BOX
58 y/o male with aphasia, facial droop started last night progressing into this morning brought to ER for aphasia, no facial droop seen at this time, code stroke was called CT brain CT angio head and neck discuss with tele stroke.

## 2022-07-18 NOTE — H&P ADULT - HISTORY OF PRESENT ILLNESS
58 y/o M with no PMH presents for right facial droop. Pt's wife Akiko was at the bedside and provided most of the history. She states that his last known normal was 6:30 pm the night prior to admission. Before going to bed she thought his smile looked strange but she did not make anything of it. He came home at 4:30 today and she noticed a more pronounced facial droop and noticed the pt was not talking much. He was having trouble remembering what happened. The pt reports having difficulty with his thoughts, unable to get words out. No slurred speech, no motor deficits, no sensory deficits. Denies fevers, chills, chest pain, SOB, abdominal pain, N/V, diarrhea/constipation.     ER course: VSS. Labs: Cr 1.34 (baseline ~1.4). EKG: Sinus bradycardia with HR 48 bpm, normal intervals, no ST segment changes, no T wave inversions (personally reviewed).     Imaging:   - CXR: cardiomegaly, possible right upper lobe consolidation, no effusion, no pneumothorax (personally reviewed).   - CT BRAIN WITHOUT CONTRAST: 1. No acute intracranial hemorrhage. 2. Findings consistent with an acute to subacute infarct involving the left basal ganglia. 3. Increased attenuation appreciated along the course of the left middle cerebral artery M1 segment. Cannot exclude thrombus formation in this location. CTA findings to follow.  - CT PERFUSION demonstrated: No core infarction. No active ischemia determined using the conventional parameter of Tmax greater than 6s. However, when using the more sensitive but less accurate Tmax >4s, there is 97 cc of brain met risk involving the right PCA and bilateral MCA territories. If symptoms persist consider follow up head CT or MRI, MRA if no contraindication.  - CTA COW: Patent intracranial circulation without flow limiting stenosis. Specifically, no thrombus formation appreciated within the M1 segment of the left middle cerebral artery. Hypoplastic right M1 segment. Hypoplastic left P1 segments with a prominent left posterior communicating artery which also contributes to flow within the left posterior cerebral artery.  - CTA NECK: If the patient has persistent symptoms, consideration could be given to brain MRI brain and/or MRA if clinically warranted and if there are no MRI contraindications.    Pt was given 81 mg of aspirin, and 75 mg of Plavix. ER physician spoke with Dr. Matias telestroke physician who requested aspirin and plavix. Pt not a TPA candidate. NIHSS 1 in the ER. He is being admitted to telemetry for further management.

## 2022-07-18 NOTE — ED PROVIDER NOTE - NSCAREINITIATED _GEN_ER
Spoke with patient and informed her surgery department confirmed surgery date for 11/16/21. Informed pt I will confirmed time with surgery dept as epic shows time for 2:45 pm. Pt informed of covid test needed 3 days prior to surgery. Pt states test was previously scheduled and has apt 11/13/21. Pt was informed she needs to quarantine after testing. Aware she will receive a call the night before from [facility] for further pre op instructions. Patient verbalized understanding.      Patient would like to know how much time she would need off after surgery and requesting a letter to provide to her employer with return to work date. Please advise    Thank you   Tania Momin(Attending)

## 2022-07-18 NOTE — ED ADULT NURSE NOTE - NSIMPLEMENTINTERV_GEN_ALL_ED
Implemented All Fall with Harm Risk Interventions:  [place] to call system. Call bell, personal items and telephone within reach. Instruct patient to call for assistance. Room bathroom lighting operational. Non-slip footwear when patient is off stretcher. Physically safe environment: no spills, clutter or unnecessary equipment. Stretcher in lowest position, wheels locked, appropriate side rails in place. Provide visual cue, wrist band, yellow gown, etc. Monitor gait and stability. Monitor for mental status changes and reorient to person, place, and time. Review medications for side effects contributing to fall risk. Reinforce activity limits and safety measures with patient and family. Provide visual clues: red socks.

## 2022-07-18 NOTE — ED ADULT NURSE NOTE - NSFALLRSKASSESASSIST_ED_ALL_ED
Occupational Therapy         Patient Name: Fredy Peguero  BGAEF'T Date: 12/30/2019    Order received and chart review performed; pt known to department and (I) in regards to ADLs/mobility; pt is discharged at this time with no needs and will complete OT orders  no

## 2022-07-18 NOTE — H&P ADULT - NSHPSOCIALHISTORY_GEN_ALL_CORE
Lives with his wife. Quit smoking at age 28. Now smokes 1 cigar a year. Occasional alcohol use. Denies drug use.

## 2022-07-18 NOTE — ED STATDOCS - PROGRESS NOTE DETAILS
Kelly WATSON for attending Dr. Ashraf: 56 y/o male brought in by wife via private car from urgent care regarding a speech abnormality and right lower facial droop. Onset noted at 8 pm last night without significant relief. No headache. No known injury. No ASA or AC use. Pt is not a tPA candidate due to symptoms being 20 hrs old. Will send pt to main ED for further evaluation after emergent CT head noncon stroke protocol.

## 2022-07-18 NOTE — ED PROVIDER NOTE - CRITICAL CARE ATTENDING CONTRIBUTION TO CARE
Tania HUANG M.D. independently provided 35 minutes of critical care including but not limited to talking to consultants, speaking with patient and family and reviewing lab and radiology results.

## 2022-07-18 NOTE — ED ADULT TRIAGE NOTE - CHIEF COMPLAINT QUOTE
pt p/w c/o facial droop, numbness/tingling in the face since 8pm last night.  +befast, code stroke activated.

## 2022-07-18 NOTE — PHARMACOTHERAPY INTERVENTION NOTE - COMMENTS
Medication reconciliation completed.  Reviewed Medication list and confirmed med allergies with patient; confirmed with Dr. Mane MedHx.  Pt confirms that he does not take any medication at home.

## 2022-07-18 NOTE — ED ADULT NURSE REASSESSMENT NOTE - NS ED NURSE REASSESS COMMENT FT1
Assumed care of Pt from TRENA Gardner. Pt received resting comfortably in stretcher. Vital signs reassessed and stable at this time. Dysphagia screen performed, negative findings. NIH stroke scale performed, negative findings. Per Pt's wife, Pt's thought process appears altered. Administered plavix and aspirin as per order. Pt placed on cardiac monitor. EKG completed. Pt to be admitted. No additional requests or complaints noted. Patient safety maintained. Will continue to monitor.

## 2022-07-18 NOTE — H&P ADULT - NSHPPHYSICALEXAM_GEN_ALL_CORE
ICU Vital Signs Last 24 Hrs  T(C): 36.8 (18 Jul 2022 19:38), Max: 36.9 (18 Jul 2022 18:24)  T(F): 98.2 (18 Jul 2022 19:38), Max: 98.5 (18 Jul 2022 18:24)  HR: 49 (18 Jul 2022 19:38) (49 - 91)  BP: 136/89 (18 Jul 2022 19:38) (132/87 - 136/89)  BP(mean): 102 (18 Jul 2022 19:38) (102 - 102)  RR: 18 (18 Jul 2022 19:38) (18 - 19)  SpO2: 96% (18 Jul 2022 19:38) (96% - 96%)    O2 Parameters below as of 18 Jul 2022 19:38  Patient On (Oxygen Delivery Method): room air    General: Awake and alert, cooperative with exam. No acute distress.   Skin: Warm, dry, and pink.   Eyes: Pupils equal and reactive to light. Extraocular eye movements intact. No conjunctival injection, discharge, or scleral icterus.   HEENT: Atraumatic, normocephalic. Moist mucus membranes.   Cardiology: Normal S1, S2. No murmurs, rubs, or gallops. Regular rate and rhythm.   Respiratory: Lungs clear to ascultation bilaterally. Good air exchange. No wheezes, rales, or rhonchi. Normal chest expansion.   Gastrointestinal: Positive bowel sounds. Soft, non-tender, non-distended. No guarding, rigidity, or rebound tenderness. No hepatosplenomegaly.   Musculoskeletal: 5/5 motor strength in all extremities. Normal range of motion.   Extremities: No peripheral edema bilaterally. Dorsalis pedis pulses 2+ bilaterally.   Neurological: A+Ox3 (person, place, and time). Cranial nerves 2-12 intact. Normal speech. Dysarthria. Mild right facial droop. No focal neurological deficits. 5/5 motor strength in all extremities. Sensation intact. Normal finger to nose testing. Normal heel to shin testing.   Psychiatric: Normal affect. Normal mood.

## 2022-07-18 NOTE — H&P ADULT - ASSESSMENT
58 y/o M presents with right facial droop     1. Right facial droop, dysarthria likely secondary to left basal ganglia CVA   - Admit to telemetry    - Not a candidate for TPA; NIHSS 1 and pt's LKN was at 6:30 pm the night prior to admission   - CT head: acute to subacute infarct involving the left basal ganglia, mild mass effect, no midline shift   - CTA COW: no thrombus of M1 segment, hypoplastic left P2 segment   - If MRI brain shows increased mass effect, will consult neurosurgery (no midline shift noted at this time)   - Please f/u CXR result (possible consolidation RUL, may need CT chest)   - Ordered MRI brain, lipid panel, HbA1c, ECHO   - Neuro checks Q4H  - NPO until dysphagia evaluation completed   - Dysphagia evaluation -> if pt passes will advance to cardiac diet   - Aspirin 325 mg now, c/w 81 mg of aspirin daily   - Start atorvastatin 80 mg daily and plavix 75 mg daily   - PT evaluation, speech evaluation   - Allow for permissive HTN (BP up to 220/120) for 24 hours  - Neurology consult - Dr. Wright   - Cardiology consult - Dr. Tatum (pt may need MOCT outpt for evaluation of arrythmia which could have contributed to infarct)     2. Elevated Cr   - Cr 1.34 (baseline ~1.4 in 2019), will trend   - Recommend outpt f/u with PCP     DVT ppx: Heparin 5,000 units Q12H (hold for PLT <50,000)    58 y/o M presents with right facial droop     1. Right facial droop, dysarthria likely secondary to left basal ganglia CVA   - Admit to telemetry    - Not a candidate for TPA; NIHSS 1 and pt's LKN was at 6:30 pm the night prior to admission   - CT head: acute to subacute infarct involving the left basal ganglia, mild mass effect, no midline shift   - CTA COW: no thrombus of M1 segment, hypoplastic left P2 segment   - If MRI brain shows increased mass effect, will consult neurosurgery (no midline shift noted at this time)   - Please f/u CXR result (possible consolidation RUL, may need CT chest)   - Ordered MRI brain, lipid panel, HbA1c, ECHO   - Neuro checks Q4H  - NPO until dysphagia evaluation completed   - Dysphagia evaluation -> if pt passes will advance to cardiac diet   - Aspirin 325 mg now, c/w 81 mg of aspirin daily   - Start atorvastatin 80 mg daily and plavix 75 mg daily   - PT evaluation, speech evaluation   - Allow for permissive HTN (BP up to 220/120) for 24 hours  - Neurology consult - Dr. Wright   - Cardiology consult - Dr. Tatum (pt may need MOCT outpt for evaluation of arrythmia which could have contributed to infarct)     2. Elevated Cr   - Cr 1.34 (baseline ~1.4 in 2019), will trend   - Recommend outpt f/u with PCP     DVT ppx: Heparin 5,000 units Q12H (hold for PLT <50,000)     *Please call the pt's wife, Sherry Strauber [phone] with updates regarding the plan of care.

## 2022-07-19 DIAGNOSIS — R00.1 BRADYCARDIA, UNSPECIFIED: ICD-10-CM

## 2022-07-19 DIAGNOSIS — E78.5 HYPERLIPIDEMIA, UNSPECIFIED: ICD-10-CM

## 2022-07-19 DIAGNOSIS — I63.9 CEREBRAL INFARCTION, UNSPECIFIED: ICD-10-CM

## 2022-07-19 LAB
A1C WITH ESTIMATED AVERAGE GLUCOSE RESULT: 5 % — SIGNIFICANT CHANGE UP (ref 4–5.6)
ANION GAP SERPL CALC-SCNC: 4 MMOL/L — LOW (ref 5–17)
BUN SERPL-MCNC: 17 MG/DL — SIGNIFICANT CHANGE UP (ref 7–23)
CALCIUM SERPL-MCNC: 9.7 MG/DL — SIGNIFICANT CHANGE UP (ref 8.5–10.1)
CHLORIDE SERPL-SCNC: 112 MMOL/L — HIGH (ref 96–108)
CHOLEST SERPL-MCNC: 167 MG/DL — SIGNIFICANT CHANGE UP
CO2 SERPL-SCNC: 26 MMOL/L — SIGNIFICANT CHANGE UP (ref 22–31)
CREAT SERPL-MCNC: 1.27 MG/DL — SIGNIFICANT CHANGE UP (ref 0.5–1.3)
EGFR: 65 ML/MIN/1.73M2 — SIGNIFICANT CHANGE UP
ESTIMATED AVERAGE GLUCOSE: 97 MG/DL — SIGNIFICANT CHANGE UP (ref 68–114)
GLUCOSE SERPL-MCNC: 89 MG/DL — SIGNIFICANT CHANGE UP (ref 70–99)
HDLC SERPL-MCNC: 51 MG/DL — SIGNIFICANT CHANGE UP
LIPID PNL WITH DIRECT LDL SERPL: 101 MG/DL — HIGH
NON HDL CHOLESTEROL: 116 MG/DL — SIGNIFICANT CHANGE UP
POTASSIUM SERPL-MCNC: 3.7 MMOL/L — SIGNIFICANT CHANGE UP (ref 3.5–5.3)
POTASSIUM SERPL-SCNC: 3.7 MMOL/L — SIGNIFICANT CHANGE UP (ref 3.5–5.3)
SODIUM SERPL-SCNC: 142 MMOL/L — SIGNIFICANT CHANGE UP (ref 135–145)
TRIGL SERPL-MCNC: 76 MG/DL — SIGNIFICANT CHANGE UP

## 2022-07-19 PROCEDURE — 70551 MRI BRAIN STEM W/O DYE: CPT | Mod: 26

## 2022-07-19 PROCEDURE — 99223 1ST HOSP IP/OBS HIGH 75: CPT

## 2022-07-19 PROCEDURE — 93010 ELECTROCARDIOGRAM REPORT: CPT

## 2022-07-19 PROCEDURE — 93306 TTE W/DOPPLER COMPLETE: CPT | Mod: 26

## 2022-07-19 PROCEDURE — 99233 SBSQ HOSP IP/OBS HIGH 50: CPT

## 2022-07-19 RX ADMIN — ATORVASTATIN CALCIUM 80 MILLIGRAM(S): 80 TABLET, FILM COATED ORAL at 21:17

## 2022-07-19 RX ADMIN — HEPARIN SODIUM 5000 UNIT(S): 5000 INJECTION INTRAVENOUS; SUBCUTANEOUS at 11:03

## 2022-07-19 RX ADMIN — Medication 81 MILLIGRAM(S): at 11:02

## 2022-07-19 RX ADMIN — Medication 3 MILLIGRAM(S): at 23:34

## 2022-07-19 RX ADMIN — HEPARIN SODIUM 5000 UNIT(S): 5000 INJECTION INTRAVENOUS; SUBCUTANEOUS at 21:17

## 2022-07-19 RX ADMIN — CLOPIDOGREL BISULFATE 75 MILLIGRAM(S): 75 TABLET, FILM COATED ORAL at 11:03

## 2022-07-19 NOTE — CONSULT NOTE ADULT - NS ATTEND AMEND GEN_ALL_CORE FT
Likely lacunar infarct.  Follow-up MRI brain.  Aspirin + Plavix x 28 days and then aspirin 81 mg monotherapy.  Statin.  Consider LINQ placement if telemetry is unrevealing.

## 2022-07-19 NOTE — CONSULT NOTE ADULT - PROBLEM SELECTOR RECOMMENDATION 3
sinus bradycardia pronounced during sleep  , likely sleep apnea , will need sleep studies , check TSH , exercise stress  to assess heart rate response to exercise when he is ok from neurologic point , as OP

## 2022-07-19 NOTE — SWALLOW BEDSIDE ASSESSMENT ADULT - COMMENTS
The pt admitted to  with onset of right facial droop and verbal expression difficulties. Imaging suspect for a new CVA in left MCA region. Work up in progress. The pt admitted to  with onset of right facial droop and verbal expression difficulties. Imaging suspect for a new CVA in left MCA/Thalamic region region. Pt also with bradycardia and hyperlipidemia.  Work up in progress.

## 2022-07-19 NOTE — PHYSICAL THERAPY INITIAL EVALUATION ADULT - PERTINENT HX OF CURRENT PROBLEM, REHAB EVAL
56 y/o M with no significant PMHx. CTH showed s/p acute to subacute infarct involving the left basal ganglia/ Left MCA/Thalamic region with right facial droop and verbal expression difficulties. NIHSS: 1

## 2022-07-19 NOTE — SWALLOW BEDSIDE ASSESSMENT ADULT - SWALLOW EVAL: CRITERIA FOR SKILLED INTERVENTION MET
DO NOT FEEL THAT ACUTE SPEECH PATHOLOGY  FOLLOW UP WOULD CHANGE CLINICAL MANAGEMENT/OUTCOME WHILE IN HOSPITAL SETTING. PT'S SPEECH-LANGUAGE ABILITIES AND OROPHARYNGEAL SWALLOWING ABILITIES ARE FELT TO BE FUNCTIONAL/AT USUAL STATE. GIVEN ABOVE, THIS SERVICE WILL NOT ACTIVELY FOLLOW. RECONSULT PRN SHOULD STATUS CHANGE AND CONDITION WARRANT.

## 2022-07-19 NOTE — CONSULT NOTE ADULT - PROBLEM SELECTOR RECOMMENDATION 9
acute thalamic infarct , less likely embolic , no evidence of afib on monitor , follow up echo with bubble ( called echo lab )  , continue statin , antiplatelet agents ,

## 2022-07-19 NOTE — PHYSICAL THERAPY INITIAL EVALUATION ADULT - MODALITIES TREATMENT COMMENTS
Pt. B/LUE's and B/LLE's MMT 5/5, amb with + ft. Nego 20 steps R HR Ind. Pt. is at PLOF and is Ind with functional mobility. Skilled PT not indicated.

## 2022-07-19 NOTE — PHYSICAL THERAPY INITIAL EVALUATION ADULT - ADDITIONAL COMMENTS
Pt. lives with his family in pvt home and has 2 steps to enter and 2 set of 8 inside home + HR. Pt. Ind with ADL's and amb with NAD + work.

## 2022-07-19 NOTE — PROGRESS NOTE ADULT - SUBJECTIVE AND OBJECTIVE BOX
Reason for Admission: Right facial droop  History of Present Illness:   56 y/o M with no PMH presents for right facial droop. Pt's wife Akiko was at the bedside and provided most of the history. She states that his last known normal was 6:30 pm the night prior to admission. Before going to bed she thought his smile looked strange but she did not make anything of it. He came home at 4:30 today and she noticed a more pronounced facial droop and noticed the pt was not talking much. He was having trouble remembering what happened. The pt reports having difficulty with his thoughts, unable to get words out. No slurred speech, no motor deficits, no sensory deficits. Denies fevers, chills, chest pain, SOB, abdominal pain, N/V, diarrhea/constipation.      Pt was given 81 mg of aspirin, and 75 mg of Plavix. ER physician spoke with Dr. Matias telestroke physician who requested aspirin and plavix. Pt not a TPA candidate. NIHSS 1 in the ER. He is being admitted to telemetry for further management.     REVIEW OF SYSTEMS:  General: NAD, hemodynamically stable, (-)  fever, (-) chills, (-) weakness  HEENT:  Eyes:  No visual loss, blurred vision, double vision or yellow sclerae. Ears, Nose, Throat:  No hearing loss, sneezing, congestion, runny nose or sore throat.  SKIN:  No rash or itching.  CARDIOVASCULAR:  No chest pain, chest pressure or chest discomfort. No palpitations or edema.  RESPIRATORY:  No shortness of breath, cough or sputum.  GASTROINTESTINAL:  No anorexia, nausea, vomiting or diarrhea. No abdominal pain or blood.  NEUROLOGICAL:  No headache, dizziness, syncope, paralysis, ataxia, numbness or tingling in the extremities. No change in bowel or bladder control.  MUSCULOSKELETAL:  No muscle, back pain, joint pain or stiffness.  HEMATOLOGIC:  No anemia, bleeding or bruising.  LYMPHATICS:  No enlarged nodes. No history of splenectomy.  ENDOCRINOLOGIC:  No reports of sweating, cold or heat intolerance. No polyuria or polydipsia.  ALLERGIES:  No history of asthma, hives, eczema or rhinitis.    Physical Exam:   GENERAL APPEARANCE:  NAD, hemodynamically stable  T(C): 36.7 (07-19-22 @ 08:34), Max: 36.9 (07-18-22 @ 18:24)  HR: 50 (07-19-22 @ 12:00) (45 - 91)  BP: 132/71 (07-19-22 @ 12:00) (112/67 - 149/84)  RR: 18 (07-19-22 @ 12:00) (18 - 19)  SpO2: 95% (07-19-22 @ 12:00) (95% - 98%)  Wt(kg): --  HEENT:  Head is normocephalic    Skin:  Warm and dry without any rash   NECK:  Supple without lymphadenopathy.   HEART:  Regular rate and rhythm. normal S1 and S2, No M/R/G  LUNGS:  Good ins/exp effort, no W/R/R/C  ABDOMEN:  Soft, nontender, nondistended with good bowel sounds heard  EXTREMITIES:  Without cyanosis, clubbing or edema.   NEUROLOGICAL:  Gross nonfocal       CBC Full  -  ( 18 Jul 2022 18:46 )  WBC Count : 7.80 K/uL  RBC Count : 4.79 M/uL  Hemoglobin : 15.1 g/dL  Hematocrit : 44.3 %  Platelet Count - Automated : 231 K/uL  Mean Cell Volume : 92.5 fl  Mean Cell Hemoglobin : 31.5 pg  Mean Cell Hemoglobin Concentration : 34.1 gm/dL  Auto Neutrophil # : 5.06 K/uL  Auto Lymphocyte # : 1.57 K/uL  Auto Monocyte # : 0.76 K/uL  Auto Eosinophil # : 0.32 K/uL  Auto Basophil # : 0.04 K/uL  Auto Neutrophil % : 65.0 %  Auto Lymphocyte % : 20.1 %  Auto Monocyte % : 9.7 %  Auto Eosinophil % : 4.1 %  Auto Basophil % : 0.5 %    PT/INR - ( 18 Jul 2022 18:46 )   PT: 12.6 sec;   INR: 1.09 ratio         PTT - ( 18 Jul 2022 18:46 )  PTT:31.0 sec    07-19    142  |  112<H>  |  17  ----------------------------<  89  3.7   |  26  |  1.27    Ca    9.7      19 Jul 2022 07:48    TPro  7.8  /  Alb  3.9  /  TBili  0.4  /  DBili  x   /  AST  24  /  ALT  36  /  AlkPhos  83  07-18      56 y/o M presents with right facial droop     # Right facial droop, dysarthria likely secondary to left basal ganglia CVA   - MRI pending  - CT scan: acute to subacute infarct involving the left basal ganglia.  - DAPT / STATIN  - Neuro checks Q4h  - Vital signs Q4h  - pending echo   - PT eval  - Speech language eval  - DVT prophylaxis     # Elevated Cr   - Cr 1.34 (baseline ~1.4 in 2019), will trend   - Recommend outpt f/u with PCP     DVT ppx: Heparin 5,000 units Q12H (hold for PLT <50,000)

## 2022-07-19 NOTE — CONSULT NOTE ADULT - PROBLEM SELECTOR RECOMMENDATION 2
blood work [facility] 10 months ago  LDl 139  HDL 69 9/13/21  , started on statin in hospital ,   continue statin target LDL <70  explained to patient , follow up LFTS ,lipid profile

## 2022-07-19 NOTE — SWALLOW BEDSIDE ASSESSMENT ADULT - SLP GENERAL OBSERVATIONS
The pt was alert. His affect was flat. Pt was interactive. His receptive language abilities were felt to be functional and he was able to verbalize during communicative probes. At these times, pt's motor speech abilities were felt to be functional and his verbalizations were fluent/linguistically intact/contextually appropriate. Pt was able to verbalize his needs and feels that he is at communicative baseline.

## 2022-07-19 NOTE — SWALLOW BEDSIDE ASSESSMENT ADULT - SWALLOW EVAL: DIAGNOSIS
1) Pt exhibits functional Oropharyngeal Swallowing abilities for age. NO behavioral aspiration signs exhibited. Odynophagia denied.   2) The pt was alert. His affect was flat. Pt was interactive. His receptive language abilities were felt to be functional and he was able to verbalize during communicative probes. At these times, pt's motor speech abilities were felt to be functional and his verbalizations were fluent/linguistically intact/contextually appropriate. Pt was able to verbalize his needs and feels that he is at communicative baseline.

## 2022-07-20 ENCOUNTER — TRANSCRIPTION ENCOUNTER (OUTPATIENT)
Age: 57
End: 2022-07-20

## 2022-07-20 PROBLEM — Z78.9 OTHER SPECIFIED HEALTH STATUS: Chronic | Status: ACTIVE | Noted: 2022-07-18

## 2022-07-20 PROCEDURE — 99232 SBSQ HOSP IP/OBS MODERATE 35: CPT

## 2022-07-20 PROCEDURE — 99233 SBSQ HOSP IP/OBS HIGH 50: CPT

## 2022-07-20 RX ORDER — SODIUM CHLORIDE 9 MG/ML
1000 INJECTION INTRAMUSCULAR; INTRAVENOUS; SUBCUTANEOUS
Refills: 0 | Status: DISCONTINUED | OUTPATIENT
Start: 2022-07-20 | End: 2022-07-21

## 2022-07-20 RX ADMIN — HEPARIN SODIUM 5000 UNIT(S): 5000 INJECTION INTRAVENOUS; SUBCUTANEOUS at 21:26

## 2022-07-20 RX ADMIN — SODIUM CHLORIDE 75 MILLILITER(S): 9 INJECTION INTRAMUSCULAR; INTRAVENOUS; SUBCUTANEOUS at 21:27

## 2022-07-20 RX ADMIN — ATORVASTATIN CALCIUM 80 MILLIGRAM(S): 80 TABLET, FILM COATED ORAL at 21:26

## 2022-07-20 RX ADMIN — Medication 81 MILLIGRAM(S): at 10:06

## 2022-07-20 RX ADMIN — HEPARIN SODIUM 5000 UNIT(S): 5000 INJECTION INTRAVENOUS; SUBCUTANEOUS at 10:07

## 2022-07-20 RX ADMIN — CLOPIDOGREL BISULFATE 75 MILLIGRAM(S): 75 TABLET, FILM COATED ORAL at 10:06

## 2022-07-20 NOTE — DISCHARGE NOTE NURSING/CASE MANAGEMENT/SOCIAL WORK - PATIENT PORTAL LINK FT
You can access the FollowMyHealth Patient Portal offered by [facility] by registering at the following website: [email or web address]. By joining [facility]’s FollowMyHealth portal, you will also be able to view your health information using other applications (apps) compatible with our system.

## 2022-07-20 NOTE — PROGRESS NOTE ADULT - ASSESSMENT
58 y/o M presents with right facial droop     # Acute left basal ganglia CVA   - MRI reviewed   - CT scan: acute to subacute infarct involving the left basal ganglia.  - DAPT / STATIN  - Neuro checks Q4h  - Vital signs Q4h  -  echo reviewed, no PFO   - PT eval  - Speech language eval  - DVT prophylaxis  - D/w DR Wright and CArdio NP, Pt will benefit from LINQ placement      # Elevated Cr   - Cr 1.34 (baseline ~1.4 in 2019), will trend   - Recommend outpt f/u with PCP     DVT ppx: Heparin 5,000 units Q12H (hold for PLT <50,000)     Dispo; Foe LINQ placement in am with EP     IVF overnight due to borderline bP

## 2022-07-20 NOTE — PROGRESS NOTE ADULT - SUBJECTIVE AND OBJECTIVE BOX
CC: Right facial droop (20 Jul 2022 11:56)    HPI:  56 y/o M with no PMH presents for right facial droop. Pt's wife Akiko was at the bedside and provided most of the history. She states that his last known normal was 6:30 pm the night prior to admission. Before going to bed she thought his smile looked strange but she did not make anything of it. He came home at 4:30 today and she noticed a more pronounced facial droop and noticed the pt was not talking much. He was having trouble remembering what happened. The pt reports having difficulty with his thoughts, unable to get words out. No slurred speech, no motor deficits, no sensory deficits. Denies fevers, chills, chest pain, SOB, abdominal pain, N/V, diarrhea/constipation.     ER course: VSS. Labs: Cr 1.34 (baseline ~1.4). EKG: Sinus bradycardia with HR 48 bpm, normal intervals, no ST segment changes, no T wave inversions (personally reviewed).     Imaging:   - CXR: cardiomegaly, possible right upper lobe consolidation, no effusion, no pneumothorax (personally reviewed).   - CT BRAIN WITHOUT CONTRAST: 1. No acute intracranial hemorrhage. 2. Findings consistent with an acute to subacute infarct involving the left basal ganglia. 3. Increased attenuation appreciated along the course of the left middle cerebral artery M1 segment. Cannot exclude thrombus formation in this location. CTA findings to follow.  - CT PERFUSION demonstrated: No core infarction. No active ischemia determined using the conventional parameter of Tmax greater than 6s. However, when using the more sensitive but less accurate Tmax >4s, there is 97 cc of brain met risk involving the right PCA and bilateral MCA territories. If symptoms persist consider follow up head CT or MRI, MRA if no contraindication.  - CTA COW: Patent intracranial circulation without flow limiting stenosis. Specifically, no thrombus formation appreciated within the M1 segment of the left middle cerebral artery. Hypoplastic right M1 segment. Hypoplastic left P1 segments with a prominent left posterior communicating artery which also contributes to flow within the left posterior cerebral artery.  - CTA NECK: If the patient has persistent symptoms, consideration could be given to brain MRI brain and/or MRA if clinically warranted and if there are no MRI contraindications.    Pt was given 81 mg of aspirin, and 75 mg of Plavix. ER physician spoke with Dr. Matias telestroke physician who requested aspirin and plavix. Pt not a TPA candidate. NIHSS 1 in the ER. He is being admitted to telemetry for further management.  (18 Jul 2022 21:37)    INTERVAL HPI/OVERNIGHT EVENTS: chart reviewed, Pt was seen and examined, still with word finding difficulty, POC discussed, EP for evaluation and discussion of LINQ     Vital Signs Last 24 Hrs  T(C): 36.4 (20 Jul 2022 18:01), Max: 36.7 (19 Jul 2022 23:12)  T(F): 97.5 (20 Jul 2022 18:01), Max: 98.1 (19 Jul 2022 23:12)  HR: 62 (20 Jul 2022 18:01) (54 - 68)  BP: 98/66 (20 Jul 2022 18:01) (98/66 - 109/66)  RR: 18 (20 Jul 2022 18:01) (18 - 18)  SpO2: 94% (20 Jul 2022 18:01) (94% - 95%)    Parameters below as of 20 Jul 2022 18:01  Patient On (Oxygen Delivery Method): room air        REVIEW OF SYSTEMS:  All other review of systems is negative unless indicated above.      PHYSICAL EXAM:  General: Well developed; well nourished; in no acute distress  Eyes: PERRLA, EOMI; conjunctiva and sclera clear  Head: Normocephalic; atraumatic  ENMT: No nasal discharge; airway clear  Neck: Supple; non tender; no masses  Respiratory: No wheezes, rales or rhonchi  Cardiovascular: Regular rate and rhythm. S1 and S2 Normal;   Gastrointestinal: Soft non-tender non-distended; Normal bowel sounds  Genitourinary: No  suprapubic  tenderness  Extremities: Normal range of motion, No  edema  Vascular: Peripheral pulses palpable 2+ bilaterally  Neurological: Alert and oriented x3, no pronator drift, MS 5/5, mild aphasia   Skin: Warm and dry. No acute rash  Lymph Nodes: No acute cervical adenopathy  Musculoskeletal: Normal muscle tone, without deformities  Psychiatric: Cooperative and appropriate    LABS    19 Jul 2022 07:48    142    |  112    |  17     ----------------------------<  89     3.7     |  26     |  1.27     Ca    9.7        19 Jul 2022 07:48        CAPILLARY BLOOD GLUCOSE      POCT Blood Glucose.: 117 mg/dL (20 Jul 2022 06:16)  POCT Blood Glucose.: 120 mg/dL (19 Jul 2022 23:32)          MEDICATIONS  (STANDING):  aspirin enteric coated 81 milliGRAM(s) Oral daily  atorvastatin 80 milliGRAM(s) Oral at bedtime  clopidogrel Tablet 75 milliGRAM(s) Oral daily  heparin   Injectable 5000 Unit(s) SubCutaneous every 12 hours    MEDICATIONS  (PRN):  acetaminophen     Tablet .. 650 milliGRAM(s) Oral every 6 hours PRN Temp greater or equal to 38C (100.4F), Mild Pain (1 - 3)  aluminum hydroxide/magnesium hydroxide/simethicone Suspension 30 milliLiter(s) Oral every 4 hours PRN Dyspepsia  melatonin 3 milliGRAM(s) Oral at bedtime PRN Insomnia  ondansetron Injectable 4 milliGRAM(s) IV Push every 8 hours PRN Nausea and/or Vomiting      RADIOLOGY & ADDITIONAL TESTS:

## 2022-07-20 NOTE — PROGRESS NOTE ADULT - PROBLEM SELECTOR PLAN 1
Acute left basal ganglia CVA less likely embolic   Tele Sinus Bradycardia   no evidence of afib on monitor  Echo ( B[facility] ) shows no evidence of PFO   Continue DAPT/ statin   ILR recommended  OPT Hematology/Rheumatology consult Acute left basal ganglia CVA less likely embolic   Tele Sinus Bradycardia   no evidence of afib on monitor  Echo ( [facility] ) shows no evidence of PFO   Continue DAPT/ statin   ILR tomorrow  OPT Hematology/Rheumatology consult

## 2022-07-20 NOTE — CONSULT NOTE ADULT - SUBJECTIVE AND OBJECTIVE BOX
HPI:  56 y/o M with no PMH presents for right facial droop. Pt's wife Akiko was at the bedside and provided most of the history. She states that his last known normal was 6:30 pm the night prior to admission. Before going to bed she thought his smile looked strange but she did not make anything of it. He came home at 4:30 today and she noticed a more pronounced facial droop and noticed the pt was not talking much. He was having trouble remembering what happened. The pt reports having difficulty with his thoughts, unable to get words out. No slurred speech, no motor deficits, no sensory deficits. Denies fevers, chills, chest pain, SOB, abdominal pain, N/V, diarrhea/constipation.   Pt diagnosed with acute CVA     7/20/22:  EP asked to evaluate pt who is s/p acute CVA for a possible ILR implant.  He has no significant PMHx but his father has a hx of Afib.  Pt seen lying in bed in NAD, wife is at bedside and they are considering ILR.  TELE: sinus rhythm, sinus hetal at times, no pauses  EKG: sinus bradycardia 48 bpm      PAST MEDICAL & SURGICAL HISTORY:  Known health problems: none    No significant past surgical history        SOCIAL HISTORY: , lives with spouse.  Denies tobacco, etoh abuse or illicit drug use    FAMILY HISTORY:  FH: lung cancer (Father)          MEDICATIONS  (STANDING):  aspirin enteric coated 81 milliGRAM(s) Oral daily  atorvastatin 80 milliGRAM(s) Oral at bedtime  clopidogrel Tablet 75 milliGRAM(s) Oral daily  heparin   Injectable 5000 Unit(s) SubCutaneous every 12 hours    MEDICATIONS  (PRN):  acetaminophen     Tablet .. 650 milliGRAM(s) Oral every 6 hours PRN Temp greater or equal to 38C (100.4F), Mild Pain (1 - 3)  aluminum hydroxide/magnesium hydroxide/simethicone Suspension 30 milliLiter(s) Oral every 4 hours PRN Dyspepsia  melatonin 3 milliGRAM(s) Oral at bedtime PRN Insomnia  ondansetron Injectable 4 milliGRAM(s) IV Push every 8 hours PRN Nausea and/or Vomiting      Allergies    No Known Allergies    Intolerances        Vital Signs Last 24 Hrs  T(C): 36.4 (20 Jul 2022 08:18), Max: 36.7 (19 Jul 2022 23:12)  T(F): 97.5 (20 Jul 2022 08:18), Max: 98.1 (19 Jul 2022 23:12)  HR: 68 (20 Jul 2022 08:18) (50 - 68)  BP: 109/66 (20 Jul 2022 08:18) (104/68 - 132/71)  BP(mean): --  RR: 18 (20 Jul 2022 08:18) (18 - 18)  SpO2: 94% (20 Jul 2022 08:18) (94% - 95%)    Parameters below as of 20 Jul 2022 08:18  Patient On (Oxygen Delivery Method): room air        REVIEW OF SYSTEMS:    CONSTITUTIONAL:  As per HPI.  HEENT:  Eyes:  No diplopia or blurred vision. ENT:  No earache, sore throat or runny nose.  CARDIOVASCULAR:  No pressure, squeezing, strangling, tightness, heaviness or aching about the chest, neck, axilla or epigastrium.  RESPIRATORY:  No cough, shortness of breath, PND or orthopnea.  GASTROINTESTINAL:  No nausea, vomiting or diarrhea.  GENITOURINARY:  No dysuria, frequency or urgency.  MUSCULOSKELETAL:  As per HPI.  SKIN:  No change in skin, hair or nails.  NEUROLOGIC:  No paresthesias, fasciculations, seizures or weakness.  PSYCHIATRIC:  No disorder of thought or mood.  ENDOCRINE:  No heat or cold intolerance, polyuria or polydipsia.  HEMATOLOGICAL:  No easy bruising or bleedings:  .     PHYSICAL EXAMINATION:    GENERAL APPEARANCE:  Pt. is not currently dyspneic, in no distress. Pt. is alert, oriented, and pleasant.  HEENT:  Pupils are normal and react normally. No icterus. Mucous membranes well colored.  NECK:  Supple. No lymphadenopathy. Jugular venous pressure not elevated. Carotids equal.   HEART:   The cardiac impulse has a normal quality. There are no murmurs, rubs or gallops noted  CHEST:  Chest is clear to auscultation. Normal respiratory effort.  ABDOMEN:  Soft and nontender.   EXTREMITIES:  There is no edema.   SKIN:  No rash or significant lesions are noted.    I&O's Summary      LABS:                        15.1   7.80  )-----------( 231      ( 18 Jul 2022 18:46 )             44.3     07-19    142  |  112<H>  |  17  ----------------------------<  89  3.7   |  26  |  1.27    Ca    9.7      19 Jul 2022 07:48    TPro  7.8  /  Alb  3.9  /  TBili  0.4  /  DBili  x   /  AST  24  /  ALT  36  /  AlkPhos  83  07-18    LIVER FUNCTIONS - ( 18 Jul 2022 18:46 )  Alb: 3.9 g/dL / Pro: 7.8 gm/dL / ALK PHOS: 83 U/L / ALT: 36 U/L / AST: 24 U/L / GGT: x           PT/INR - ( 18 Jul 2022 18:46 )   PT: 12.6 sec;   INR: 1.09 ratio         PTT - ( 18 Jul 2022 18:46 )  PTT:31.0 sec  CARDIAC MARKERS ( 18 Jul 2022 18:46 )  x     / x     / 101 U/L / x     / x            CARDIAC TESTS:    < from: TTE Echo Complete w/o Contrast w/ Doppler (07.19.22 @ 14:11) >   Impression     Summary     The aortic valve is trileaflet with thin pliable leaflets.   Normal appearing left atrium.   An interatrial septal aneurysm is noted. There is no evidence of PFO on   bubble study   The left ventricle is normal in size, wall thickness, wall motion and   contractility.   Estimated left ventricular ejection fraction is 70 %.   The IVC appears normal.   The mitral valve leaflets appear thin and normal.   EA reversal of the mitral inflow consistent with reduced compliance of   the   left ventricle.   Trivial mitral regurgitation is present.   No evidence of pericardial effusion.   No evidence of pleural effusion.   Normal appearing pulmonic valve structure.   Trace pulmonic valvular regurgitation is present.   Normal appearing right atrium.   Normal appearing right ventricle structure and function.   The tricuspid valve leaflets are thin and pliable; valve motion is   normal.   Trace tricuspid valve regurgitation is present.          RADIOLOGY & ADDITIONAL STUDIES:    < from: CT Brain Stroke Protocol (07.18.22 @ 18:27) >    IMPRESSIONS:      CT BRAIN WITHOUT CONTRAST:  1. No acute intracranial hemorrhage.  2. Findings consistent with an acute to subacute infarct involving the   left basal ganglia.  3. Increased attenuation appreciated along the course of the left middle   cerebral artery M1 segment. Cannot exclude thrombus formation in this   location. CTA findings to follow.    Findings were communicated by Dr. Behr-Ventura to BRIAN Dunne in the   emergency department at approximately 6:35 PM 7/18/2022..        < from: MR Head No Cont (07.19.22 @ 18:17) >    IMPRESSION:  No acute intracranial hemorrhage or acute infarct.      
Patient is a 57y old Male presents with a chief complaint of Right facial droop (18 Jul 2022 21:37)    HPI:  58 y/o M with no significant PMHx presents to  ED on 7/18 with c/o right facial droop and difficulty speaking, LKN was at 6:30 pm on 7/17. Pt's wife states on 7/17 before going to bed, she thought his smile looked strange but she did not make anything of it. On 7/18, when he came home from work at 4:30 pm she noticed a more pronounced right facial droop and noticed that pt was not talking as much, and he was having trouble remembering what happened. They decided to come to ED for evaluation that evening.    In ED code stroke was called around 18:23. CTH/CTA head and neck and CTP were unremarkable or CTH showed acute to subacute infarct involving the left basal ganglia. CTA head and neck showed no LVO or thrombus formation. CT perfusion showed no core infarct. IV TPA was not given because pt was out of the time window sx were present > 4.5 hrs, NIHSS was 1 in ED for mild aphasia. ER physician spoke with telestroke, who advised to give aspirin 324 mg and plavix, did not recommend transfer since he was not a TPA candidate.     Today, pt feels like he is still having some difficulty getting his words out and forming his thoughts. He denies any dizziness, changes in vision, difficulty ambulating, numbness or tingling. He states he has no medical problems, has never had a stroke, does not take BASA at home.    PAST MEDICAL & SURGICAL HISTORY:  Known health problems: none  No significant past surgical history    FAMILY HISTORY:  FH: lung cancer (Father)    Social Hx:  Nonsmoker, no drug or alcohol use    MEDICATIONS  (STANDING):  aspirin enteric coated 81 milliGRAM(s) Oral daily  atorvastatin 80 milliGRAM(s) Oral at bedtime  clopidogrel Tablet 75 milliGRAM(s) Oral daily  heparin   Injectable 5000 Unit(s) SubCutaneous every 12 hours     Allergies: No Known Allergies    ROS: Pertinent positives in HPI, all other ROS were reviewed and are negative.      Vital Signs Last 24 Hrs  T(C): 36.7 (19 Jul 2022 08:34), Max: 36.9 (18 Jul 2022 18:24)  T(F): 98.1 (19 Jul 2022 08:34), Max: 98.5 (18 Jul 2022 18:24)  HR: 50 (19 Jul 2022 08:34) (45 - 91)  BP: 112/67 (19 Jul 2022 08:34) (112/67 - 149/84)  BP(mean): 104 (18 Jul 2022 23:43) (102 - 104)  RR: 18 (19 Jul 2022 08:34) (18 - 19)  SpO2: 95% (19 Jul 2022 08:34) (95% - 98%)    Parameters below as of 19 Jul 2022 08:34  Patient On (Oxygen Delivery Method): room air    Physical Exam:    General: Normocephalic, NAD  HEENT: PERRLA, EOMI  Neck: Supple.  Respiratory: Breath sounds are clear bilaterally  Cardiovascular: S1 and S2  Extremities:  no edema  Vascular: Carotid Bruit - no  Musculoskeletal: no joint swelling/tenderness, no abnormal movements  Skin: No rashes    Neurological exam:  HF: A x O x 3. Appropriately interactive, normal affect. Speech fluent, No Aphasia or paraphasic errors. Naming /repetition intact   CN: TIGIST, EOMI, VFF, facial sensation normal, no NLFD, tongue midline, Palate moves equally, SCM equal bilaterally  Motor: No pronator drift, Strength 5/5 in all 4 ext, normal bulk and tone, no tremor, rigidity or bradykinesia.    Sens: Mildly decreased sensation L side of face   Reflexes: Symmetric and normal, BJ 2+, BR 2+, KJ 2+, AJ 2+, downgoing toes b/l  Coord:  No FNFA, dysmetria  Gait/Balance: Cannot test    NIHSS: 1    RESULT SUMMARY:  1 points  NIH Stroke Scale    INPUTS:  1A: Level of consciousness —> 0 = Alert; keenly responsive  1B: Ask month and age —> 0 = Both questions right  1C: 'Blink eyes' & 'squeeze hands' —> 0 = Performs both tasks  2: Horizontal extraocular movements —> 0 = Normal  3: Visual fields —> 0 = No visual loss  4: Facial palsy —> 0 = Normal symmetry  5A: Left arm motor drift —> 0 = No drift for 10 seconds  5B: Right arm motor drift —> 0 = No drift for 10 seconds  6A: Left leg motor drift —> 0 = No drift for 5 seconds  6B: Right leg motor drift —> 0 = No drift for 5 seconds  7: Limb Ataxia —> 0 = No ataxia  8: Sensation —> 1 = Mild-moderate loss: less sharp/more dull   9: Language/aphasia —> 0 = Normal; no aphasia  10: Dysarthria —> 0 = Normal  11: Extinction/inattention —> 0 = No abnormality    Labs:   07-19    142  |  112<H>  |  17  ----------------------------<  89  3.7   |  26  |  1.27    Ca    9.7      19 Jul 2022 07:48    TPro  7.8  /  Alb  3.9  /  TBili  0.4  /  DBili  x   /  AST  24  /  ALT  36  /  AlkPhos  83  07-18                     15.1   7.80  )-----------( 231      ( 18 Jul 2022 18:46 )             44.3       Radiology:   CT Brain Stroke protocol:  CT BRAIN WITHOUT CONTRAST:  1. No acute intracranial hemorrhage.  2. Findings consistent with an acute to subacute infarct involving the   left basal ganglia.  3. Increased attenuation appreciated along the course of the left middle   cerebral artery M1 segment. Cannot exclude thrombus formation in this   location. CTA findings to follow.    CT PERFUSION demonstrated: No core infarction. No active ischemia   determined using the conventional parameter of Tmax greater than 6s.   However, when using the more sensitive but less accurate Tmax >4s, there   is 97 cc of brain met risk involving the right PCA and bilateral MCA   territories.    CTA COW:  Patent intracranial circulation without flow limiting stenosis.   Specifically, no thrombus formation appreciated within the M1 segment of   the left middle cerebral artery.. Hypoplastic right M1 segment.   Hypoplastic left P1 segments with a prominent left posterior   communicating artery which also contributes to flow within the left   posterior cerebral artery.    CT Angio Neck Stroke Protocol w/ IV Cont (07.18.22 @ 18:59)    Left: The left common carotid artery emanates from the aortic arch. The   left common carotid artery is normal in course and caliber to the carotid   bifurcation. Origin of the left internal carotid artery is are   unremarkable based on NASCET criteria. The left internal carotid artery   has normal course and caliber to the intracranial circulation.    Right:  The right common carotid artery emanates from the brachiocephalic   artery. The right common carotid artery is normal in course and caliber   to the carotid bifurcation. Origins of the right internal carotid artery   is unremarkable based on NASCET criteria. The right internal carotid   artery has normal course and caliber to the intracranial circulation.                  
h& P and patient     CHIEF COMPLAINT:  fascial droop     HPI:  58 y/o M with no PMH presents for right facial droop. Pt's wife Akiko was at the bedside and provided most of the history. She states that his last known normal was 6:30 pm the night prior to admission. Before going to bed she thought his smile looked strange but she did not make anything of it. He came home at 4:30 today and she noticed a more pronounced facial droop and noticed the pt was not talking much. He was having trouble remembering what happened. The pt reports having difficulty with his thoughts, unable to get words out. No slurred speech, no motor deficits, no sensory deficits. Denies fevers, chills, chest pain, SOB, abdominal pain, N/V, diarrhea/constipation.     ER course: VSS. Labs: Cr 1.34 (baseline ~1.4). EKG: Sinus bradycardia with HR 48 bpm, normal intervals,  Patient CT brain acute to subacute left basal ganglia  left basal ganglia      Imaging:   - CXR: cardiomegaly, possible right upper lobe consolidation, no effusion, no pneumothorax (personally reviewed).       Pt was given 81 mg of aspirin, and 75 mg of Plavix. ER physician spoke with Dr. Matias telestroke physician who requested aspirin and plavix. Pt not a TPA candidate. NIHSS 1 in the ER. He is being admitted to telemetry for further management.     Patient denies any prior hx of hypertension , was told to have mild hyperlipidemia not on any medication , blood pressure is normal  sinus bradycardia   CT head showed no thrombus ,but hypoplastic M1 , P1 segments noted   Patient does snow , his heart rate is low during sleep  possible sleep apnea ,               PAST MEDICAL & SURGICAL HISTORY:  GERD       No significant past surgical history          Allergies    No Known Allergies    Intolerances            Social History:  Lives with his wife. Quit smoking at age 28. Now smokes 1 cigar a year. Occasional alcohol use. Denies drug use. (18 Jul 2022 21:37)      FAMILY HISTORY:  FH: lung cancer (Father)        MEDICATIONS:  MEDICATIONS  (STANDING):  aspirin enteric coated 81 milliGRAM(s) Oral daily  atorvastatin 80 milliGRAM(s) Oral at bedtime  clopidogrel Tablet 75 milliGRAM(s) Oral daily  heparin   Injectable 5000 Unit(s) SubCutaneous every 12 hours    MEDICATIONS  (PRN):  acetaminophen     Tablet .. 650 milliGRAM(s) Oral every 6 hours PRN Temp greater or equal to 38C (100.4F), Mild Pain (1 - 3)  aluminum hydroxide/magnesium hydroxide/simethicone Suspension 30 milliLiter(s) Oral every 4 hours PRN Dyspepsia  melatonin 3 milliGRAM(s) Oral at bedtime PRN Insomnia  ondansetron Injectable 4 milliGRAM(s) IV Push every 8 hours PRN Nausea and/or Vomiting      REVIEW OF SYSTEMS:    CONSTITUTIONAL: No weakness, fevers or chills  EYES/ENT: No visual changes;  No vertigo or throat pain   NECK: No pain or stiffness  RESPIRATORY: No cough, wheezing, hemoptysis; No shortness of breath  CARDIOVASCULAR: No chest pain or palpitations  GASTROINTESTINAL: No abdominal or epigastric pain. No nausea, vomiting, or hematemesis; No diarrhea or constipation. No melena or hematochezia.  GENITOURINARY: No dysuria, frequency or hematuria  NEUROLOGICAL: No numbness or weakness  SKIN: No itching, burning, rashes, or lesions   All other review of systems is negative unless indicated above    Vital Signs Last 24 Hrs  T(C): 36.7 (19 Jul 2022 08:34), Max: 36.9 (18 Jul 2022 18:24)  T(F): 98.1 (19 Jul 2022 08:34), Max: 98.5 (18 Jul 2022 18:24)  HR: 50 (19 Jul 2022 08:34) (45 - 91)  BP: 112/67 (19 Jul 2022 08:34) (112/67 - 149/84)  BP(mean): 104 (18 Jul 2022 23:43) (102 - 104)  RR: 18 (19 Jul 2022 08:34) (18 - 19)  SpO2: 95% (19 Jul 2022 08:34) (95% - 98%)    Parameters below as of 19 Jul 2022 08:34  Patient On (Oxygen Delivery Method): room air        I&O's Summary      PHYSICAL EXAM:    Constitutional: NAD, awake and alert, well-developed  HEENT: PERR, EOMI,  No oral cyananosis.  Neck:  supple,  No JVD  Respiratory: Breath sounds are clear bilaterally, No wheezing, rales or rhonchi  Cardiovascular: S1 and S2, regular rate and rhythm, no Murmurs, gallops or rubs  Gastrointestinal: Bowel Sounds present, soft, nontender.   Extremities: No peripheral edema. No clubbing or cyanosis.  Vascular: 2+ peripheral pulses  Neurological: A/O x 3, no focal deficits  Musculoskeletal: no calf tenderness.  Skin: No rashes.      LABS: All Labs Reviewed:                        15.1   7.80  )-----------( 231      ( 18 Jul 2022 18:46 )             44.3     19 Jul 2022 07:48    142    |  112    |  17     ----------------------------<  89     3.7     |  26     |  1.27   18 Jul 2022 18:46    140    |  110    |  19     ----------------------------<  90     4.2     |  26     |  1.34     Ca    9.7        19 Jul 2022 07:48  Ca    9.7        18 Jul 2022 18:46    TPro  7.8    /  Alb  3.9    /  TBili  0.4    /  DBili  x      /  AST  24     /  ALT  36     /  AlkPhos  83     18 Jul 2022 18:46    PT/INR - ( 18 Jul 2022 18:46 )   PT: 12.6 sec;   INR: 1.09 ratio         PTT - ( 18 Jul 2022 18:46 )  PTT:31.0 sec  CARDIAC MARKERS ( 18 Jul 2022 18:46 )  x     / x     / 101 U/L / x     / x            Blood Culture:      lipid profile   ,      RADIOLOGY/EKG:    < from: 12 Lead ECG (07.18.22 @ 19:22) >  Sinus bradycardia  Otherwise normal ECG  When compared with ECG of 03-FEB-2019 07:25,  No significant change was found  Confirmed by NIKOS BLUM MD [id number]) on 7/19/2022 7:33:20 AM    < end of copied text >

## 2022-07-20 NOTE — PROGRESS NOTE ADULT - SUBJECTIVE AND OBJECTIVE BOX
h& P and patient     CHIEF COMPLAINT:  fascial droop     HPI:  58 y/o M with no PMH presents for right facial droop. Pt's wife Akiko was at the bedside and provided most of the history. She states that his last known normal was 6:30 pm the night prior to admission. Before going to bed she thought his smile looked strange but she did not make anything of it. He came home at 4:30 today and she noticed a more pronounced facial droop and noticed the pt was not talking much. He was having trouble remembering what happened. The pt reports having difficulty with his thoughts, unable to get words out. No slurred speech, no motor deficits, no sensory deficits. Denies fevers, chills, chest pain, SOB, abdominal pain, N/V, diarrhea/constipation.     ER course: VSS. Labs: Cr 1.34 (baseline ~1.4). EKG: Sinus bradycardia with HR 48 bpm, normal intervals,  Patient CT brain acute to subacute left basal ganglia  left basal ganglia      Imaging:   - CXR: cardiomegaly, possible right upper lobe consolidation, no effusion, no pneumothorax (personally reviewed).       Pt was given 81 mg of aspirin, and 75 mg of Plavix. ER physician spoke with Dr. Matias telestroke physician who requested aspirin and plavix. Pt not a TPA candidate. NIHSS 1 in the ER. He is being admitted to telemetry for further management.     Patient denies any prior hx of hypertension , was told to have mild hyperlipidemia not on any medication , blood pressure is normal  sinus bradycardia   CT head showed no thrombus ,but hypoplastic M1 , P1 segments noted   Patient does snow , his heart rate is low during sleep  possible sleep apnea ,     7/20/22 feeling well no focal signs tele SB             PAST MEDICAL & SURGICAL HISTORY:  GERD       No significant past surgical history          Allergies    No Known Allergies    Intolerances            Social History:  Lives with his wife. Quit smoking at age 28. Now smokes 1 cigar a year. Occasional alcohol use. Denies drug use. (18 Jul 2022 21:37)      FAMILY HISTORY:  FH: lung cancer (Father)        MEDICATIONS  (STANDING):  aspirin enteric coated 81 milliGRAM(s) Oral daily  atorvastatin 80 milliGRAM(s) Oral at bedtime  clopidogrel Tablet 75 milliGRAM(s) Oral daily  heparin   Injectable 5000 Unit(s) SubCutaneous every 12 hours    MEDICATIONS  (PRN):  acetaminophen     Tablet .. 650 milliGRAM(s) Oral every 6 hours PRN Temp greater or equal to 38C (100.4F), Mild Pain (1 - 3)  aluminum hydroxide/magnesium hydroxide/simethicone Suspension 30 milliLiter(s) Oral every 4 hours PRN Dyspepsia  melatonin 3 milliGRAM(s) Oral at bedtime PRN Insomnia  ondansetron Injectable 4 milliGRAM(s) IV Push every 8 hours PRN Nausea and/or Vomiting      Vital Signs Last 24 Hrs  T(C): 36.4 (20 Jul 2022 08:18), Max: 36.7 (19 Jul 2022 23:12)  T(F): 97.5 (20 Jul 2022 08:18), Max: 98.1 (19 Jul 2022 23:12)  HR: 68 (20 Jul 2022 08:18) (50 - 68)  BP: 109/66 (20 Jul 2022 08:18) (104/68 - 132/71)  BP(mean): --  RR: 18 (20 Jul 2022 08:18) (18 - 18)  SpO2: 94% (20 Jul 2022 08:18) (94% - 95%)    Parameters below as of 20 Jul 2022 08:18  Patient On (Oxygen Delivery Method): room air        Parameters below as of 19 Jul 2022 08:34  Patient On (Oxygen Delivery Method): room air        I&O's Summary      PHYSICAL EXAM:    Constitutional: NAD, awake and alert, well-developed  HEENT: PERR, EOMI,  No oral cyananosis.  Neck:  supple,  No JVD  Respiratory: Breath sounds are clear bilaterally,  Cardiovascular: S1 and S2,   Gastrointestinal: Abd soft, nontender.   Extremities: No LE Edema  Vascular: 2+ peripheral pulses  Neurological: A/O x 3, no focal deficits  Musculoskeletal: no calf tenderness.  Skin: No rashes.      LABS: All Labs Reviewed:                        15.1   7.80  )-----------( 231      ( 18 Jul 2022 18:46 )             44.3     19 Jul 2022 07:48    142    |  112    |  17     ----------------------------<  89     3.7     |  26     |  1.27   18 Jul 2022 18:46    140    |  110    |  19     ----------------------------<  90     4.2     |  26     |  1.34     Ca    9.7        19 Jul 2022 07:48  Ca    9.7        18 Jul 2022 18:46    TPro  7.8    /  Alb  3.9    /  TBili  0.4    /  DBili  x      /  AST  24     /  ALT  36     /  AlkPhos  83     18 Jul 2022 18:46    PT/INR - ( 18 Jul 2022 18:46 )   PT: 12.6 sec;   INR: 1.09 ratio         PTT - ( 18 Jul 2022 18:46 )  PTT:31.0 sec  CARDIAC MARKERS ( 18 Jul 2022 18:46 )  x     / x     / 101 U/L / x     / x            Blood Culture:      lipid profile   ,      RADIOLOGY/EKG:    < from: 12 Lead ECG (07.18.22 @ 19:22) >  Sinus bradycardia  Otherwise normal ECG  When compared with ECG of 03-FEB-2019 07:25,  No significant change was found  Confirmed by KULWANT LEOS, NIKOS ([id number]) on 7/19/2022 7:33:20 AM    < end of copied text >

## 2022-07-20 NOTE — PROGRESS NOTE ADULT - SUBJECTIVE AND OBJECTIVE BOX
Pt seen at bedside, has no complaints.    MRI showed small acute infarct in left basal ganglia.     ROS: As stated above, all others are negative.    Vital Signs Last 24 Hrs  T(C): 36.4 (20 Jul 2022 08:18), Max: 36.7 (19 Jul 2022 23:12)  T(F): 97.5 (20 Jul 2022 08:18), Max: 98.1 (19 Jul 2022 23:12)  HR: 68 (20 Jul 2022 08:18) (50 - 68)  BP: 109/66 (20 Jul 2022 08:18) (104/68 - 132/71)  BP(mean): --  RR: 18 (20 Jul 2022 08:18) (18 - 18)  SpO2: 94% (20 Jul 2022 08:18) (94% - 95%)    Parameters below as of 20 Jul 2022 08:18  Patient On (Oxygen Delivery Method): room air    MEDICATIONS  (STANDING):  aspirin enteric coated 81 milliGRAM(s) Oral daily  atorvastatin 80 milliGRAM(s) Oral at bedtime  clopidogrel Tablet 75 milliGRAM(s) Oral daily  heparin   Injectable 5000 Unit(s) SubCutaneous every 12 hours    MEDICATIONS  (PRN):  acetaminophen     Tablet .. 650 milliGRAM(s) Oral every 6 hours PRN Temp greater or equal to 38C (100.4F), Mild Pain (1 - 3)  aluminum hydroxide/magnesium hydroxide/simethicone Suspension 30 milliLiter(s) Oral every 4 hours PRN Dyspepsia  melatonin 3 milliGRAM(s) Oral at bedtime PRN Insomnia  ondansetron Injectable 4 milliGRAM(s) IV Push every 8 hours PRN Nausea and/or Vomiting    PHYSICAL EXAM:  Physical Exam:    General: Normocephalic, NAD  HEENT: PERRLA, EOMI    Neurological exam:  HF: A x O x 3. Appropriately interactive, normal affect. Speech fluent, No Aphasia or paraphasic errors. Naming /repetition intact   CN: TIGIST, EOMI, VFF, facial sensation normal, no NLFD, tongue midline, Palate moves equally, SCM equal bilaterally  Motor: No pronator drift, Strength 5/5 in all 4 ext, normal bulk and tone, no tremor, rigidity or bradykinesia.    Sens: Intact sensation throughout  Reflexes: Symmetric and normal, BJ 2+, BR 2+, KJ 2+, AJ 2+, downgoing toes b/l  Coord:  No FNFA, dysmetria  Gait/Balance: Cannot test    NIHSS: 0    LABS:                         15.1   7.80  )-----------( 231      ( 18 Jul 2022 18:46 )             44.3     07-19    142  |  112<H>  |  17  ----------------------------<  89  3.7   |  26  |  1.27    Ca    9.7      19 Jul 2022 07:48    TPro  7.8  /  Alb  3.9  /  TBili  0.4  /  DBili  x   /  AST  24  /  ALT  36  /  AlkPhos  83  07-18    LIVER FUNCTIONS - ( 18 Jul 2022 18:46 )  Alb: 3.9 g/dL / Pro: 7.8 gm/dL / ALK PHOS: 83 U/L / ALT: 36 U/L / AST: 24 U/L / GGT: x           07-19 Chol 167 LDL -- HDL 51 Trig 76    RADIOLOGY:  MR Head No Cont (07.19.22 @ 18:17)  FINDINGS:  There is no abnormal T2 prolongation signal in the white matter.    There is no acute parenchymal hemorrhage, parenchymal mass, mass effect   or midline shift. There is no extra-axial fluid collection.  There is no   hydrocephalus.    Acute infarct left corona radiata/basal ganglia measuring 1.5 x 1.3 cm.     CT Brain Stroke protocol:  CT BRAIN WITHOUT CONTRAST:  1. No acute intracranial hemorrhage.  2. Findings consistent with an acute to subacute infarct involving the   left basal ganglia.  3. Increased attenuation appreciated along the course of the left middle   cerebral artery M1 segment. Cannot exclude thrombus formation in this   location. CTA findings to follow.    CT PERFUSION demonstrated: No core infarction. No active ischemia   determined using the conventional parameter of Tmax greater than 6s.   However, when using the more sensitive but less accurate Tmax >4s, there   is 97 cc of brain met risk involving the right PCA and bilateral MCA   territories.    CTA COW:  Patent intracranial circulation without flow limiting stenosis.   Specifically, no thrombus formation appreciated within the M1 segment of   the left middle cerebral artery.. Hypoplastic right M1 segment.   Hypoplastic left P1 segments with a prominent left posterior   communicating artery which also contributes to flow within the left   posterior cerebral artery.    CT Angio Neck Stroke Protocol w/ IV Cont (07.18.22 @ 18:59)    Left: The left common carotid artery emanates from the aortic arch. The   left common carotid artery is normal in course and caliber to the carotid   bifurcation. Origin of the left internal carotid artery is are   unremarkable based on NASCET criteria. The left internal carotid artery   has normal course and caliber to the intracranial circulation.    Right:  The right common carotid artery emanates from the brachiocephalic   artery. The right common carotid artery is normal in course and caliber   to the carotid bifurcation. Origins of the right internal carotid artery   is unremarkable based on NASCET criteria. The right internal carotid   artery has normal course and caliber to the intracranial circulation.    TTE Echo Complete w/o Contrast w/ Doppler (07.19.22 @ 14:11)    An interatrial septal aneurysm is noted. There is no evidence of PFO on   bubble study   The left ventricle is normal in size, wall thickness, wall motion and   contractility.   Estimated left ventricular ejection fraction is 70 %.   The IVC appears normal.

## 2022-07-20 NOTE — CONSULT NOTE ADULT - ASSESSMENT
56 y/o M with no significant PMHx presents to  ED on 7/18 with c/o right facial droop and difficulty speaking, LKN was at 6:30 pm on 7/17. Pt's wife states on 7/17 before going to bed, she thought his smile looked strange but she did not make anything of it. On 7/18, when he came home from work at 4:30 pm she noticed a more pronounced right facial droop and noticed that pt was not talking as much, and he was having trouble remembering what happened. They decided to come to ED for evaluation that evening.    In ED code stroke was called around 18:23. CTH/CTA head and neck and CTP were unremarkable or CTH showed acute to subacute infarct involving the left basal ganglia. CTA head and neck showed no LVO or thrombus formation. CT perfusion showed no core infarct. IV TPA was not given because pt was out of the time window sx were present > 4.5 hrs, NIHSS was 1 in ED for mild aphasia. ER physician spoke with telestroke, who advised to give aspirin 324 mg and plavix, did not recommend transfer since he was not a TPA candidate.     #Left basal ganglia CVA, acute to subacute    - F/u on MRI head   - Continue ASA 81 mg QD and Plavix  - Continue Atorvastatin, f/u on lipid profile, Hgb A1c within 24 hours  - Neuro checks Q4h  - Vital signs Q4h  - Blood glucose checks Q6h for 1st 24hrs  - Echo  - PT eval  - Speech language eval  - DVT prophylaxis  - Telemonitoring    Patient was discussed with Dr. Wright
57 yr old male with no significant PMHx presented with right facial droop and difficulty getting thoughts out, trouble word finding, he was diagnosed with acute CVA, no tPA as he was out of window.  No personal history of atrial fib but his father had Afib.  Spoke with patient and his wife and mother (who was on the phone) to discuss ILR and they wish to think about it.    2D echo reveals normal LV function EF 70%    A/P: Acute CVA  Continue tele monitoring, so far sinus rhythm/sinus hetal  LVEF 70%  Started on ASA, plavix, statin.  He is a good candidate for ILR for long term arrhythmia surveillance.  He and his family will discuss and think about it, written material provided to them.  EP will follow  Discussed plan with Dr. Herrera, pt, RN, family.

## 2022-07-20 NOTE — DISCHARGE NOTE NURSING/CASE MANAGEMENT/SOCIAL WORK - NSDCFUADDAPPT_GEN_ALL_CORE_FT
Follow-up appoinment with Dr. Wright   Date:12/1/22 Time: 10:00am Follow-up appoinment with Dr. Wright   Date:10/20/22 Time: 10:00am

## 2022-07-20 NOTE — DISCHARGE NOTE NURSING/CASE MANAGEMENT/SOCIAL WORK - NSDCPEFALRISK_GEN_ALL_CORE
For information on Fall & Injury Prevention, visit: [email or web address] OR  [email or web address] OR  https://www.cdc.gov/steadi/patient.html

## 2022-07-20 NOTE — CHART NOTE - NSCHARTNOTEFT_GEN_A_CORE
EP  Patient and family are agreeable to ILR implant.  Will schedule for tomorrow, 7/21/22.  Hold Heparin SQ after 10 pm tonight.  The patient does not need to be NPO.

## 2022-07-20 NOTE — PROGRESS NOTE ADULT - ASSESSMENT
56 y/o M with no significant PMHx presents to  ED on 7/18 with c/o right facial droop and difficulty speaking, LKN was at 6:30 pm on 7/17. Pt's wife states on 7/17 before going to bed, she thought his smile looked strange but she did not make anything of it. On 7/18, when he came home from work at 4:30 pm she noticed a more pronounced right facial droop and noticed that pt was not talking as much, and he was having trouble remembering what happened. They decided to come to ED for evaluation that evening.    In ED code stroke was called around 18:23. CTH/CTA head and neck and CTP were unremarkable or CTH showed acute to subacute infarct involving the left basal ganglia. CTA head and neck showed no LVO or thrombus formation. CT perfusion showed no core infarct. IV TPA was not given because pt was out of the time window sx were present > 4.5 hrs, NIHSS was 1 in ED for mild aphasia. ER physician spoke with telestroke, who advised to give aspirin 324 mg and plavix, did not recommend transfer since he was not a TPA candidate.     #Acute left basal ganglia CVA- NIHSS 0    - Continue ASA 81 mg QD, and Plavix x 28 days  - Continue Atorvastatin  - Continue neuro checks Q4h, Vital signs Q4h  - Telemonitoring, DVT prophylaxis  - Would check with cardiology to see if LINQ placement is recommended before discharge    Patient was discussed with Dr. Wright

## 2022-07-21 ENCOUNTER — TRANSCRIPTION ENCOUNTER (OUTPATIENT)
Age: 57
End: 2022-07-21

## 2022-07-21 VITALS
DIASTOLIC BLOOD PRESSURE: 64 MMHG | RESPIRATION RATE: 18 BRPM | HEART RATE: 64 BPM | OXYGEN SATURATION: 97 % | SYSTOLIC BLOOD PRESSURE: 120 MMHG

## 2022-07-21 LAB
ANION GAP SERPL CALC-SCNC: 3 MMOL/L — LOW (ref 5–17)
BUN SERPL-MCNC: 19 MG/DL — SIGNIFICANT CHANGE UP (ref 7–23)
CALCIUM SERPL-MCNC: 9 MG/DL — SIGNIFICANT CHANGE UP (ref 8.5–10.1)
CHLORIDE SERPL-SCNC: 114 MMOL/L — HIGH (ref 96–108)
CO2 SERPL-SCNC: 24 MMOL/L — SIGNIFICANT CHANGE UP (ref 22–31)
CREAT SERPL-MCNC: 1.34 MG/DL — HIGH (ref 0.5–1.3)
CRP SERPL-MCNC: <3 MG/L — SIGNIFICANT CHANGE UP
EGFR: 61 ML/MIN/1.73M2 — SIGNIFICANT CHANGE UP
ERYTHROCYTE [SEDIMENTATION RATE] IN BLOOD: 6 MM/HR — SIGNIFICANT CHANGE UP (ref 0–20)
GLUCOSE SERPL-MCNC: 94 MG/DL — SIGNIFICANT CHANGE UP (ref 70–99)
HCT VFR BLD CALC: 43.4 % — SIGNIFICANT CHANGE UP (ref 39–50)
HGB BLD-MCNC: 15.1 G/DL — SIGNIFICANT CHANGE UP (ref 13–17)
MCHC RBC-ENTMCNC: 31.9 PG — SIGNIFICANT CHANGE UP (ref 27–34)
MCHC RBC-ENTMCNC: 34.8 GM/DL — SIGNIFICANT CHANGE UP (ref 32–36)
MCV RBC AUTO: 91.6 FL — SIGNIFICANT CHANGE UP (ref 80–100)
PLATELET # BLD AUTO: 194 K/UL — SIGNIFICANT CHANGE UP (ref 150–400)
POTASSIUM SERPL-MCNC: 3.9 MMOL/L — SIGNIFICANT CHANGE UP (ref 3.5–5.3)
POTASSIUM SERPL-SCNC: 3.9 MMOL/L — SIGNIFICANT CHANGE UP (ref 3.5–5.3)
RBC # BLD: 4.74 M/UL — SIGNIFICANT CHANGE UP (ref 4.2–5.8)
RBC # FLD: 12.4 % — SIGNIFICANT CHANGE UP (ref 10.3–14.5)
SODIUM SERPL-SCNC: 141 MMOL/L — SIGNIFICANT CHANGE UP (ref 135–145)
WBC # BLD: 6.24 K/UL — SIGNIFICANT CHANGE UP (ref 3.8–10.5)
WBC # FLD AUTO: 6.24 K/UL — SIGNIFICANT CHANGE UP (ref 3.8–10.5)

## 2022-07-21 PROCEDURE — 93010 ELECTROCARDIOGRAM REPORT: CPT

## 2022-07-21 PROCEDURE — 99239 HOSP IP/OBS DSCHRG MGMT >30: CPT

## 2022-07-21 PROCEDURE — 33285 INSJ SUBQ CAR RHYTHM MNTR: CPT

## 2022-07-21 PROCEDURE — 99232 SBSQ HOSP IP/OBS MODERATE 35: CPT

## 2022-07-21 RX ORDER — ACETAMINOPHEN 500 MG
2 TABLET ORAL
Qty: 0 | Refills: 0 | DISCHARGE
Start: 2022-07-21

## 2022-07-21 RX ORDER — ATORVASTATIN CALCIUM 80 MG/1
1 TABLET, FILM COATED ORAL
Qty: 30 | Refills: 0
Start: 2022-07-21 | End: 2022-08-19

## 2022-07-21 RX ORDER — CLOPIDOGREL BISULFATE 75 MG/1
1 TABLET, FILM COATED ORAL
Qty: 30 | Refills: 0
Start: 2022-07-21 | End: 2022-08-19

## 2022-07-21 RX ORDER — ASPIRIN/CALCIUM CARB/MAGNESIUM 324 MG
1 TABLET ORAL
Qty: 0 | Refills: 0 | DISCHARGE
Start: 2022-07-21

## 2022-07-21 RX ADMIN — Medication 81 MILLIGRAM(S): at 10:23

## 2022-07-21 RX ADMIN — CLOPIDOGREL BISULFATE 75 MILLIGRAM(S): 75 TABLET, FILM COATED ORAL at 10:23

## 2022-07-21 NOTE — PROCEDURAL SAFETY CHECKLIST WITH OR WITHOUT SEDATION - NSPOSTCOMMENTFT_GEN_ALL_CORE
Patient denies complaint of pain or discomfort, Patient with left chest wound in place.  Patient with dermabond in place. No bleeding redness swelling or pain noted at site. Comfort and safety measures provided will continue to monitor patient status.

## 2022-07-21 NOTE — PROGRESS NOTE ADULT - ASSESSMENT
56 y/o M with no significant PMHx presents to  ED on 7/18 with c/o right facial droop and difficulty speaking, LKN was at 6:30 pm on 7/17. Pt's wife states on 7/17 before going to bed, she thought his smile looked strange but she did not make anything of it. On 7/18, when he came home from work at 4:30 pm she noticed a more pronounced right facial droop and noticed that pt was not talking as much, and he was having trouble remembering what happened. They decided to come to ED for evaluation that evening.    In ED code stroke was called around 18:23. CTH/CTA head and neck and CTP were unremarkable or CTH showed acute to subacute infarct involving the left basal ganglia. CTA head and neck showed no LVO or thrombus formation. CT perfusion showed no core infarct. IV TPA was not given because pt was out of the time window sx were present > 4.5 hrs, NIHSS was 1 in ED for mild aphasia. ER physician spoke with telestroke, who advised to give aspirin 324 mg and plavix, did not recommend transfer since he was not a TPA candidate.     #Acute left basal ganglia CVA- NIHSS 0    - Continue ASA 81 mg QD, and Plavix x 28 days  - Continue Atorvastatin  - Continue neuro checks Q4h, Vital signs Q4h  - Telemonitoring, DVT prophylaxis  - Would check with cardiology to see if LINQ placement is recommended before discharge    Patient was discussed with Dr. Wright     58 y/o M with no significant PMHx presents to  ED on 7/18 with c/o right facial droop and difficulty speaking, LKN was at 6:30 pm on 7/17. Pt's wife states on 7/17 before going to bed, she thought his smile looked strange but she did not make anything of it. On 7/18, when he came home from work at 4:30 pm she noticed a more pronounced right facial droop and noticed that pt was not talking as much, and he was having trouble remembering what happened. They decided to come to ED for evaluation that evening.    In ED code stroke was called around 18:23. CTH/CTA head and neck and CTP were unremarkable or CTH showed acute to subacute infarct involving the left basal ganglia. CTA head and neck showed no LVO or thrombus formation. CT perfusion showed no core infarct. IV TPA was not given because pt was out of the time window sx were present > 4.5 hrs, NIHSS was 1 in ED for mild aphasia. ER physician spoke with telestroke, who advised to give aspirin 324 mg and plavix, did not recommend transfer since he was not a TPA candidate.     #Acute left basal ganglia CVA- NIHSS 0    - Continue ASA 81 mg QD, and Plavix x 28 days and then aspirin monotherapy.  - Continue Atorvastatin  - LINQ placement today  -DVT prophylaxis  -Hematology eval as outpatient for possible hypercoagulable state  -Outpatient sleep study to eval for possible LATIA.    Discussed with Dr. Rodriguez

## 2022-07-21 NOTE — PROGRESS NOTE ADULT - PROBLEM SELECTOR PLAN 1
Acute left basal ganglia CVA less likely embolic   Echo ( Bubble ) shows no evidence of PFO   no evidence of afib on monitor   Tele Sinus Bradycardia strips reviewed HR 50's HS   Continue DAPT/ statin   ILR today if no events on Loop reasonable to consider OSIRIS  OPT Hematology/Rheumatology consult Acute left basal ganglia CVA less likely embolic   Echo ( Bubble ) shows no evidence of PFO   Surveillance Echo  Interatrial septal aneurysm   no evidence of afib on monitor   Tele Sinus Bradycardia strips reviewed HR 50's HS   Continue DAPT/ statin   ILR today if no events on Loop reasonable to consider OSIRIS  OPT Hematology/Rheumatology consult

## 2022-07-21 NOTE — DISCHARGE NOTE PROVIDER - CARE PROVIDERS DIRECT ADDRESSES
[email or web address]

## 2022-07-21 NOTE — PROGRESS NOTE ADULT - REASON FOR ADMISSION
Right facial droop

## 2022-07-21 NOTE — PROVIDER CONTACT NOTE (OTHER) - SITUATION
Answering service is aware of consult
spoke to Fabiola from office. They are aware
Bradycardia 20s-30s intermittent

## 2022-07-21 NOTE — DISCHARGE NOTE PROVIDER - HOSPITAL COURSE
56 y/o M with no PMH presents for right facial droop. Pt's wife Akiko was at the bedside and provided most of the history. She states that his last known normal was 6:30 pm the night prior to admission. Before going to bed she thought his smile looked strange but she did not make anything of it. He came home at 4:30 today and she noticed a more pronounced facial droop and noticed the pt was not talking much. He was having trouble remembering what happened. The pt reports having difficulty with his thoughts, unable to get words out. No slurred speech, no motor deficits, no sensory deficits. Denies fevers, chills, chest pain, SOB, abdominal pain, N/V, diarrhea/constipation.   ER course: VSS. Labs: Cr 1.34 (baseline ~1.4). EKG: Sinus bradycardia with HR 48 bpm, normal intervals, no ST segment changes, no T wave inversions (personally reviewed).   Imaging:   - CXR: cardiomegaly, possible right upper lobe consolidation, no effusion, no pneumothorax (personally reviewed).   - CT BRAIN WITHOUT CONTRAST: 1. No acute intracranial hemorrhage. 2. Findings consistent with an acute to subacute infarct involving the left basal ganglia. 3. Increased attenuation appreciated along the course of the left middle cerebral artery M1 segment. Cannot exclude thrombus formation in this location. CTA findings to follow.  - CT PERFUSION demonstrated: No core infarction. No active ischemia determined using the conventional parameter of Tmax greater than 6s. However, when using the more sensitive but less accurate Tmax >4s, there is 97 cc of brain met risk involving the right PCA and bilateral MCA territories. If symptoms persist consider follow up head CT or MRI, MRA if no contraindication.  - CTA COW: Patent intracranial circulation without flow limiting stenosis. Specifically, no thrombus formation appreciated within the M1 segment of the left middle cerebral artery. Hypoplastic right M1 segment. Hypoplastic left P1 segments with a prominent left posterior communicating artery which also contributes to flow within the left posterior cerebral artery.  - CTA NECK: If the patient has persistent symptoms, consideration could be given to brain MRI brain and/or MRA if clinically warranted and if there are no MRI contraindications.  Pt was given 81 mg of aspirin, and 75 mg of Plavix. ER physician spoke with Dr. Florencio parada stroke physician who requested aspirin and plavix. Pt not a TPA candidate. NIHSS 1 in the ER. He was admitted to tele. Had MRI done which showed acute L basal ganglia infarct. Pt was evaluated by neuro LINQ placement and cardio eval advised. Pt had echo done,  preserved EF, no PFO, I/atrial septal aneurysm. D/w cardio  and EP, Pt had LINQ placed to r/o occult AFIB/AFlutter. ALso I/atrial septal aneurysm not clinically significant, no intervention or Tx needed.   Pt today feels well,  speech much improved. Wants to go home. OutPt f/u discussed        Vital Signs Last 24 Hrs  T(C): 36.8 (21 Jul 2022 13:30), Max: 36.8 (21 Jul 2022 13:30)  T(F): 98.2 (21 Jul 2022 13:30), Max: 98.2 (21 Jul 2022 13:30)  HR: 64 (21 Jul 2022 13:50) (57 - 68)  BP: 120/64 (21 Jul 2022 13:50) (98/66 - 123/73)  RR: 18 (21 Jul 2022 13:50) (16 - 18)  SpO2: 97% (21 Jul 2022 13:50) (94% - 97%)    Parameters below as of 21 Jul 2022 13:50  Patient On (Oxygen Delivery Method): room air      PHYSICAL EXAM:  General: Well developed; well nourished; in no acute distress  Eyes: PERRLA, EOMI; conjunctiva and sclera clear  Head: Normocephalic; atraumatic  ENMT: No nasal discharge; airway clear  Neck: Supple; non tender; no masses  Respiratory: No wheezes, rales or rhonchi  Cardiovascular: Regular rate and rhythm. S1 and S2 Normal;   Gastrointestinal: Soft non-tender non-distended; Normal bowel sounds  Genitourinary: No  suprapubic  tenderness  Extremities: Normal range of motion, No  edema  Vascular: Peripheral pulses palpable 2+ bilaterally  Neurological: Alert and oriented x3, no pronator drift, MS 5/5,  aphasia improved   Skin: Warm and dry. No acute rash  Lymph Nodes: No acute cervical adenopathy  Musculoskeletal: Normal muscle tone, without deformities  Psychiatric: Cooperative and appropriate      56 y/o M presents with right facial droop     # Acute left basal ganglia CVA   - MRI reviewed   - CT scan: acute to subacute infarct involving the left basal ganglia.  - DAPT x 28 days and then ASA 81mg / STATIN  - Neuro checks Q4h  - Vital signs Q4h  -  echo reviewed, no PFO , preserved EF.   - PT eval  - Speech language eval  - DVT prophylaxis  - D/w DR Wright and CArdio NP, Pt will benefit from LINQ placement      # Elevated Cr, Dehydration   - Cr 1.34 (baseline ~1.4 in 2019)  - S/p IVF   - Recommend outpt f/u with PCP     DVT ppx: Heparin 5,000 units Q12H (hold for PLT <50,000)       Dispo: stable for d/c home  Total time 36 min   Fax d/c summary to PCP

## 2022-07-21 NOTE — DISCHARGE NOTE PROVIDER - PROVIDER TOKENS
PROVIDER:[TOKEN:[[id number]],FOLLOWUP:[1 month]],PROVIDER:[TOKEN:[[id number]],FOLLOWUP:[1 week]],PROVIDER:[TOKEN:[[id number]],FOLLOWUP:[1 month]]

## 2022-07-21 NOTE — DISCHARGE NOTE PROVIDER - NSDCMRMEDTOKEN_GEN_ALL_CORE_FT
acetaminophen 325 mg oral tablet: 2 tab(s) orally every 6 hours, As needed, Mild Pain (1 - 3)  aspirin 81 mg oral delayed release tablet: 1 tab(s) orally once a day  atorvastatin 80 mg oral tablet: 1 tab(s) orally once a day (at bedtime)  clopidogrel 75 mg oral tablet: 1 tab(s) orally once a day

## 2022-07-21 NOTE — PROGRESS NOTE ADULT - SUBJECTIVE AND OBJECTIVE BOX
h& P and patient     CHIEF COMPLAINT:  fascial droop     HPI:  58 y/o M with no PMH presents for right facial droop. Pt's wife Akiko was at the bedside and provided most of the history. She states that his last known normal was 6:30 pm the night prior to admission. Before going to bed she thought his smile looked strange but she did not make anything of it. He came home at 4:30 today and she noticed a more pronounced facial droop and noticed the pt was not talking much. He was having trouble remembering what happened. The pt reports having difficulty with his thoughts, unable to get words out. No slurred speech, no motor deficits, no sensory deficits. Denies fevers, chills, chest pain, SOB, abdominal pain, N/V, diarrhea/constipation.     ER course: VSS. Labs: Cr 1.34 (baseline ~1.4). EKG: Sinus bradycardia with HR 48 bpm, normal intervals,  Patient CT brain acute to subacute left basal ganglia  left basal ganglia      Imaging:   - CXR: cardiomegaly, possible right upper lobe consolidation, no effusion, no pneumothorax (personally reviewed).       Pt was given 81 mg of aspirin, and 75 mg of Plavix. ER physician spoke with Dr. Matias telestroke physician who requested aspirin and plavix. Pt not a TPA candidate. NIHSS 1 in the ER. He is being admitted to telemetry for further management.     Patient denies any prior hx of hypertension , was told to have mild hyperlipidemia not on any medication , blood pressure is normal  sinus bradycardia   CT head showed no thrombus ,but hypoplastic M1 , P1 segments noted   Patient does snow , his heart rate is low during sleep  possible sleep apnea ,     7/20/22 feeling well no focal signs tele SB   7/21/22 feeling well Bradycardia noted overnight Tele strips reviewed HR not <50's current HR 64 is scheduled for ILR today reports he is feeling well no focal signs facial droop resolved   labs reviewed creat mildly elevated             PAST MEDICAL & SURGICAL HISTORY:  GERD       No significant past surgical history          Allergies    No Known Allergies    Intolerances            Social History:  Lives with his wife. Quit smoking at age 28. Now smokes 1 cigar a year. Occasional alcohol use. Denies drug use. (18 Jul 2022 21:37)      FAMILY HISTORY:  FH: lung cancer (Father)        MEDICATIONS  (STANDING):  aspirin enteric coated 81 milliGRAM(s) Oral daily  atorvastatin 80 milliGRAM(s) Oral at bedtime  clopidogrel Tablet 75 milliGRAM(s) Oral daily  sodium chloride 0.9%. 1000 milliLiter(s) (75 mL/Hr) IV Continuous <Continuous>    MEDICATIONS  (PRN):  acetaminophen     Tablet .. 650 milliGRAM(s) Oral every 6 hours PRN Temp greater or equal to 38C (100.4F), Mild Pain (1 - 3)  aluminum hydroxide/magnesium hydroxide/simethicone Suspension 30 milliLiter(s) Oral every 4 hours PRN Dyspepsia  melatonin 3 milliGRAM(s) Oral at bedtime PRN Insomnia  ondansetron Injectable 4 milliGRAM(s) IV Push every 8 hours PRN Nausea and/or Vomiting      Vital Signs Last 24 Hrs  T(C): 36.4 (20 Jul 2022 23:12), Max: 36.4 (20 Jul 2022 18:01)  T(F): 97.5 (20 Jul 2022 23:12), Max: 97.5 (20 Jul 2022 18:01)  HR: 57 (20 Jul 2022 23:12) (57 - 62)  BP: 112/67 (20 Jul 2022 23:12) (98/66 - 112/67)  BP(mean): --  RR: 18 (20 Jul 2022 18:01) (18 - 18)  SpO2: 94% (20 Jul 2022 23:12) (94% - 94%)    Parameters below as of 20 Jul 2022 23:12  Patient On (Oxygen Delivery Method): room air      I&O's Summary      PHYSICAL EXAM:    Constitutional: NAD, awake and alert, well-developed  HEENT: PERR, EOMI,  No oral cyananosis.  Neck:  supple,  No JVD  Respiratory: Breath sounds are clear bilaterally,  Cardiovascular: S1 and S2,   Gastrointestinal: Abd soft, nontender.   Extremities: No LE Edema  Vascular: 2+ peripheral pulses  Neurological: A/O x 3, no focal deficits  Musculoskeletal: no calf tenderness.  Skin: No rashes.      LABS: All Labs Reviewed:                        15.1   7.80  )-----------( 231      ( 18 Jul 2022 18:46 )             44.3     19 Jul 2022 07:48    142    |  112    |  17     ----------------------------<  89     3.7     |  26     |  1.27   18 Jul 2022 18:46    140    |  110    |  19     ----------------------------<  90     4.2     |  26     |  1.34     Ca    9.7        19 Jul 2022 07:48  Ca    9.7        18 Jul 2022 18:46    TPro  7.8    /  Alb  3.9    /  TBili  0.4    /  DBili  x      /  AST  24     /  ALT  36     /  AlkPhos  83     18 Jul 2022 18:46    PT/INR - ( 18 Jul 2022 18:46 )   PT: 12.6 sec;   INR: 1.09 ratio         PTT - ( 18 Jul 2022 18:46 )  PTT:31.0 sec  CARDIAC MARKERS ( 18 Jul 2022 18:46 )  x     / x     / 101 U/L / x     / x            Blood Culture:      lipid profile   ,      RADIOLOGY/EKG:    < from: 12 Lead ECG (07.18.22 @ 19:22) >  Sinus bradycardia  Otherwise normal ECG  When compared with ECG of 03-FEB-2019 07:25,  No significant change was found  Confirmed by NIKOS BLUM MD ([id number]) on 7/19/2022 7:33:20 AM    < end of copied text >   REASON FOR VISIT:  CVA    HPI:  56 y/o M with no PMH presents for right facial droop. Pt's wife Akiko was at the bedside and provided most of the history. She states that his last known normal was 6:30 pm the night prior to admission. Before going to bed she thought his smile looked strange but she did not make anything of it. He came home at 4:30 today and she noticed a more pronounced facial droop and noticed the pt was not talking much. He was having trouble remembering what happened. The pt reports having difficulty with his thoughts, unable to get words out. No slurred speech, no motor deficits, no sensory deficits. Denies fevers, chills, chest pain, SOB, abdominal pain, N/V, diarrhea/constipation.     ER course: VSS. Labs: Cr 1.34 (baseline ~1.4). EKG: Sinus bradycardia with HR 48 bpm, normal intervals,  Patient CT brain acute to subacute left basal ganglia  left basal ganglia      Imaging:   - CXR: cardiomegaly, possible right upper lobe consolidation, no effusion, no pneumothorax (personally reviewed).       Pt was given 81 mg of aspirin, and 75 mg of Plavix. ER physician spoke with Dr. Matias telestroke physician who requested aspirin and plavix. Pt not a TPA candidate. NIHSS 1 in the ER. He is being admitted to telemetry for further management.     Patient denies any prior hx of hypertension , was told to have mild hyperlipidemia not on any medication , blood pressure is normal  sinus bradycardia   CT head showed no thrombus ,but hypoplastic M1 , P1 segments noted   Patient does snow , his heart rate is low during sleep  possible sleep apnea    7/20/22 feeling well no focal signs tele SB   7/21/22 feeling well Bradycardia noted overnight Tele strips reviewed HR not <50's current HR 64 is scheduled for ILR today reports he is feeling well no focal signs facial droop resolved. labs reviewed creat mildly elevated     MEDICATIONS  (STANDING):  aspirin enteric coated 81 milliGRAM(s) Oral daily  atorvastatin 80 milliGRAM(s) Oral at bedtime  clopidogrel Tablet 75 milliGRAM(s) Oral daily  sodium chloride 0.9%. 1000 milliLiter(s) (75 mL/Hr) IV Continuous <Continuous>    MEDICATIONS  (PRN):  acetaminophen     Tablet .. 650 milliGRAM(s) Oral every 6 hours PRN Temp greater or equal to 38C (100.4F), Mild Pain (1 - 3)  aluminum hydroxide/magnesium hydroxide/simethicone Suspension 30 milliLiter(s) Oral every 4 hours PRN Dyspepsia  melatonin 3 milliGRAM(s) Oral at bedtime PRN Insomnia  ondansetron Injectable 4 milliGRAM(s) IV Push every 8 hours PRN Nausea and/or Vomiting    Vital Signs Last 24 Hrs  T(C): 36.4 (20 Jul 2022 23:12), Max: 36.4 (20 Jul 2022 18:01)  T(F): 97.5 (20 Jul 2022 23:12), Max: 97.5 (20 Jul 2022 18:01)  HR: 57 (20 Jul 2022 23:12) (57 - 62)  BP: 112/67 (20 Jul 2022 23:12) (98/66 - 112/67)  RR: 18 (20 Jul 2022 18:01) (18 - 18)  SpO2: 94% (20 Jul 2022 23:12) (94% - 94%)    PHYSICAL EXAM:  Constitutional: NAD, awake and alert, well-developed  HEENT: PERR, EOMI,  No oral cyananosis.  Neck:  supple,  No JVD  Respiratory: Breath sounds are clear bilaterally,  Cardiovascular: S1 and S2,   Gastrointestinal: Abd soft, nontender.   Extremities: No LE Edema  Vascular: 2+ peripheral pulses  Neurological: A/O x 3, no focal deficits  Musculoskeletal: no calf tenderness.  Skin: No rashes.    LABS:                       15.1   7.80  )-----------( 231      ( 18 Jul 2022 18:46 )             44.3     142    |  112    |  17     ----------------------------<  89     3.7     |  26     |  1.27     CARDIAC MARKERS ( 18 Jul 2022 18:46 ) x     / x     / 101 U/L / x     / x        12 Lead ECG (07.18.22 @ 19:22):  Sinus bradycardia  Otherwise normal ECG  When compared with ECG of 03-FEB-2019 07:25, No significant change was found  Confirmed by NIKOS BLUM MD ([id number]) on 7/19/2022 7:33:20 AM

## 2022-07-21 NOTE — PROVIDER CONTACT NOTE (OTHER) - ASSESSMENT
/70, Sp02 93%, HR 51. Patient states he feels okay, but feels a little "not himself".  Otherwise patient is comfortable and denies lightheadedness, dizziness.

## 2022-07-21 NOTE — PROGRESS NOTE ADULT - NS ATTEND AMEND GEN_ALL_CORE FT
I spoke with the patient.  He was initially not agreeable to LINQ however, cardiology spoke with his wife who may convince him.  I do think that a LINQ should be placed.  In addition, a home sleep study is recommended.  Also suggest follow up with hematology for hypercoagulable workup.
Patient seen and examined; telemetry reviewed (no AF/AFL); I discussed case with Dr Wright; ILR planned; continue present management.
Discussed plan of care with NP. Agree with plan of care.

## 2022-07-21 NOTE — DISCHARGE NOTE PROVIDER - CARE PROVIDER_API CALL
Kendy Wright)  Clinical Neurophysiology; EEGEpilepsy; Neurology; Sleep Medicine  [address], [address]  [place] [address]  Phone: [phone]  Fax: [phone]  Follow Up Time: 1 month    Oleg Stein  INTERNAL MEDICINE  [address] [place] [address]  Phone: [phone]  Fax: [phone]  Follow Up Time: 1 week    Phillip Herrera)  Cardiac Electrophysiology; Cardiovascular Disease  [address] [place] [address]  Phone: [phone]  Fax: [phone]  Follow Up Time: 1 month

## 2022-07-21 NOTE — PROGRESS NOTE ADULT - PROBLEM SELECTOR PLAN 3
Bradycardia more pronounced HS  Advise OPT Sleep study  OPT stress test assess HR response ( [facility] Neuro )
Bradycardia more pronounced HS; strips reviewed HR 50's HS   Advise OPT Sleep study  OPT stress test assess HR response ( [facility] Neuro )  Check TSH  EKG  Creat Mildly elevated encourage PO fluids and trend labs

## 2022-07-21 NOTE — DISCHARGE NOTE PROVIDER - NSDCCPCAREPLAN_GEN_ALL_CORE_FT
PRINCIPAL DISCHARGE DIAGNOSIS  Diagnosis: Cerebrovascular accident (CVA)  Assessment and Plan of Treatment: Take  aspirin 81mg daily and Plavix 75mg daily x 28 days, then continue only low dose aspirin  C/w Lipitor daily   F/u with Dr Wright and PCP       PRINCIPAL DISCHARGE DIAGNOSIS  Diagnosis: Cerebrovascular accident (CVA)  Assessment and Plan of Treatment: Take  aspirin 81mg daily and Plavix 75mg daily x 28 days, then continue only low dose aspirin  C/w Lipitor daily   F/u with Dr Wright and PCP      SECONDARY DISCHARGE DIAGNOSES  Diagnosis: Stage 2 chronic kidney disease  Assessment and Plan of Treatment: oral hydration  avoid  NSAIDs: Motrin, Advil, aleve, ibuprophen, naproxen   F/u with PCP  for further evaluation and monitoring

## 2022-07-21 NOTE — CDI QUERY NOTE - NSCDIOTHERTXTBX_GEN_ALL_CORE_HH
Can you please clarify if the Interatrial septal aneurysm finding on the OSIRIS is clinically significant?    A) Interatrial septal aneurysm is clinically significant with outpatient cardiology follow-up   B) Interatrial septal aneurysm not clinically significant   C) Other (please specify) ________________.   D) Unable to determine     Supporting documentation:     TTE Echo Complete w/o Contrast w/ Doppler (07.19.22 @ 14:11) The aortic valve is trileaflet with thin pliable leaflets. An interatrial septal aneurysm is noted. There is no evidence of PFO on bubble study    Cardiology progress note 7/21/2022    Problem: Cerebrovascular accident (CVA).   ·  Plan: Acute left basal ganglia CVA less likely embolic   Echo ( Bubble ) shows no evidence of PFO   no evidence of afib on monitor   Tele Sinus Bradycardia strips reviewed HR 50's HS   Continue DAPT/ statin   ILR today if no events on Loop reasonable to consider OSIRIS  OPT Hematology/Rheumatology consult.

## 2022-07-25 ENCOUNTER — APPOINTMENT (OUTPATIENT)
Dept: NEUROLOGY | Facility: CLINIC | Age: 57
End: 2022-07-25

## 2022-07-25 VITALS
HEART RATE: 55 BPM | DIASTOLIC BLOOD PRESSURE: 77 MMHG | HEIGHT: 63 IN | BODY MASS INDEX: 30.12 KG/M2 | SYSTOLIC BLOOD PRESSURE: 118 MMHG | WEIGHT: 170 LBS | TEMPERATURE: 97.8 F

## 2022-07-25 DIAGNOSIS — R06.83 SNORING: ICD-10-CM

## 2022-07-25 PROCEDURE — 99496 TRANSJ CARE MGMT HIGH F2F 7D: CPT

## 2022-07-25 NOTE — CONSULT LETTER
[Dear  ___] : Dear  [unfilled], [Consult Letter:] : I had the pleasure of evaluating your patient, [unfilled]. [Please see my note below.] : Please see my note below. [Consult Closing:] : Thank you very much for allowing me to participate in the care of this patient.  If you have any questions, please do not hesitate to contact me. [FreeTextEntry2] : Oleg Moe [FreeTextEntry3] : Sincerely,\par \par \par Kendy Wright MD\par Diplomate, American Academy of Psychiatry and Neurology\par Board Certified in the Subspecialty of Clinical Neurophysiology\par Board Certified in the Subspecialty of Sleep Medicine\par Board Certified in the Subspecialty of Epilepsy\par

## 2022-07-25 NOTE — DATA REVIEWED
[de-identified] : MRI brain 7/19/22:\par small acute infarct left corona radiata/basal ganglia [de-identified] : CT head, CTA head and neck, CT perfusion  7/18/22:\par 1. No acute intracranial hemorrhage.\par 2. Findings consistent with an acute to subacute infarct involving the \par left basal ganglia.\par 3. Increased attenuation appreciated along the course of the left middle \par cerebral artery M1 segment. Cannot exclude thrombus formation in this \par location. CTA findings to follow.\par \par \par TTE 7/19/22:\par \par  The aortic valve is trileaflet with thin pliable leaflets.\par  Normal appearing left atrium.\par  An interatrial septal aneurysm is noted. There is no evidence of PFO on\par  bubble study\par  The left ventricle is normal in size, wall thickness, wall motion and\par  contractility.\par  Estimated left ventricular ejection fraction is 70 %.\par  The IVC appears normal.\par  The mitral valve leaflets appear thin and normal.\par  EA reversal of the mitral inflow consistent with reduced compliance of \par the\par  left ventricle.\par  Trivial mitral regurgitation is present.\par  No evidence of pericardial effusion.\par  No evidence of pleural effusion.\par  Normal appearing pulmonic valve structure.\par  Trace pulmonic valvular regurgitation is present.\par  Normal appearing right atrium.\par  Normal appearing right ventricle structure and function.\par  The tricuspid valve leaflets are thin and pliable; valve motion is \par normal.\par  Trace tricuspid valve regurgitation is present.\par

## 2022-07-25 NOTE — HISTORY OF PRESENT ILLNESS
[FreeTextEntry1] : Mr. Strauber presents after recent hospitalization.\par \par He was admitted to [facility] on 7/18/22.\par He had a right facial droop and difficulty speaking. He was outside of the window for tpa.\par CT head showed an acute/subacute infarct in the left basal ganglia. This was confirmed on MRI brain. \par He was started on aspirin + plavix.\par A LINQ was placed.\par \par 7/25/22:\par He is here with his wife.\par His wife noted that when he first came home she still noted issues with his speech. He was not initiating conversations. She is now noticing improvement; he is talking more.\par He does still feel tired and short of breath. \par He does snore. His wife has noted occasional pauses in breathing during sleep.\par He has not noted any signs of bleeding.\par He has had some diarrhea but it seems to be improving.\par \par His father had a stroke in his 80s. \par His brother also had a stroke at about age 50. However, he was abusing drugs.\par \par \par

## 2022-07-25 NOTE — DISCUSSION/SUMMARY
[FreeTextEntry1] : Mr. Strauber is a 57 year old man who was hospitalized from 7/18/22-7/21/22 with an episode of right facial droop and difficulty speaking.\par Imaging revealed an acute infarct in the left corona radiata/basal ganglia.\par \par Acute Stroke\par -Continue aspirin + Plavix x 28 days. After 28 days, discontinue Plavix and continue aspirin monotherapy.\par -Continue atorvastatin 80 mg.\par -Will get home sleep study to evaluate for possible LATIA which can increase the risk for stroke.\par -Can check labs for hypercoagulable workup since there was no clear cause for his stroke.\par -Follow-up with electrophysiology to interrogate LINQ.\par -Return to hospital with any stroke like symptoms.\par \par He can return to work. I will give him a note stating that he may benefit from a shortened work day initially.\par \par Follow-up ~ 4 months, sooner if needed.\par

## 2022-07-27 DIAGNOSIS — Z79.82 LONG TERM (CURRENT) USE OF ASPIRIN: ICD-10-CM

## 2022-07-27 DIAGNOSIS — N18.2 CHRONIC KIDNEY DISEASE, STAGE 2 (MILD): ICD-10-CM

## 2022-07-27 DIAGNOSIS — K21.9 GASTRO-ESOPHAGEAL REFLUX DISEASE WITHOUT ESOPHAGITIS: ICD-10-CM

## 2022-07-27 DIAGNOSIS — R47.01 APHASIA: ICD-10-CM

## 2022-07-27 DIAGNOSIS — R29.701 NIHSS SCORE 1: ICD-10-CM

## 2022-07-27 DIAGNOSIS — G47.33 OBSTRUCTIVE SLEEP APNEA (ADULT) (PEDIATRIC): ICD-10-CM

## 2022-07-27 DIAGNOSIS — R00.1 BRADYCARDIA, UNSPECIFIED: ICD-10-CM

## 2022-07-27 DIAGNOSIS — Z87.891 PERSONAL HISTORY OF NICOTINE DEPENDENCE: ICD-10-CM

## 2022-07-27 DIAGNOSIS — R29.810 FACIAL WEAKNESS: ICD-10-CM

## 2022-07-27 DIAGNOSIS — E78.5 HYPERLIPIDEMIA, UNSPECIFIED: ICD-10-CM

## 2022-07-27 DIAGNOSIS — I63.89 OTHER CEREBRAL INFARCTION: ICD-10-CM

## 2022-07-27 DIAGNOSIS — I25.3 ANEURYSM OF HEART: ICD-10-CM

## 2022-07-28 ENCOUNTER — APPOINTMENT (OUTPATIENT)
Dept: NEUROLOGY | Facility: CLINIC | Age: 57
End: 2022-07-28

## 2022-07-28 PROCEDURE — 95806 SLEEP STUDY UNATT&RESP EFFT: CPT

## 2022-08-01 ENCOUNTER — NON-APPOINTMENT (OUTPATIENT)
Age: 57
End: 2022-08-01

## 2022-08-11 ENCOUNTER — APPOINTMENT (OUTPATIENT)
Dept: ELECTROPHYSIOLOGY | Facility: CLINIC | Age: 57
End: 2022-08-11

## 2022-08-11 VITALS
HEIGHT: 63 IN | OXYGEN SATURATION: 97 % | HEART RATE: 72 BPM | SYSTOLIC BLOOD PRESSURE: 111 MMHG | RESPIRATION RATE: 16 BRPM | DIASTOLIC BLOOD PRESSURE: 74 MMHG | WEIGHT: 170 LBS | BODY MASS INDEX: 30.12 KG/M2

## 2022-08-11 PROCEDURE — 93285 PRGRMG DEV EVAL SCRMS IP: CPT

## 2022-08-11 NOTE — ASSESSMENT
[FreeTextEntry1] : Incision well healed, no redness swelling or bruising noted.  \par ILR interrogation shows sinus rhythm, no episodes of tachy or hetal arrhythmias recorded, no afib. remote monitoring discussed with pt and he will follow up in office in 6months.

## 2022-08-11 NOTE — HISTORY OF PRESENT ILLNESS
[None] : The patient complains of no symptoms [FreeTextEntry1] : Mr. Strauber presents for postop follow up s/p ILR implant.\par He was admitted to [facility] on 7/18/22.\par He had a right facial droop and difficulty speaking. He was outside of the window for tpa.\par CT head showed an acute/subacute infarct in the left basal ganglia. This was confirmed on MRI brain. \par He was started on aspirin + plavix.\par A LINQ was placed.\par \par \par \par

## 2022-09-14 NOTE — PATIENT PROFILE ADULT - VISION (WITH CORRECTIVE LENSES IF THE PATIENT USUALLY WEARS THEM):
Pt's Niece,Krista called again req Dr Rabia Singh call her to discuss pt's discharge from [facility]. She is insisting to speak to the doctor himself to get pt home with her. Angelic Ro stated she has moved in with pt and wants her home.     Please call Angelic Ro ASAP per Angelic Ro Partially impaired: cannot see medication labels or newsprint, but can see obstacles in path, and the surrounding layout; can count fingers at arm's length

## 2022-09-15 ENCOUNTER — APPOINTMENT (OUTPATIENT)
Dept: ELECTROPHYSIOLOGY | Facility: CLINIC | Age: 57
End: 2022-09-15

## 2022-09-16 ENCOUNTER — NON-APPOINTMENT (OUTPATIENT)
Age: 57
End: 2022-09-16

## 2022-09-16 PROCEDURE — 93298 REM INTERROG DEV EVAL SCRMS: CPT

## 2022-09-16 PROCEDURE — G2066: CPT

## 2022-10-07 NOTE — PACU DISCHARGE NOTE - COMMENTS
Patient denies complaint of pain or discomfort. Left chest with dermabond in place. No redness swelling or pain at left chest site. Patient received instruction for monitor set up via cell phone by [employer] rep. Patient verbalizes understanding, offers no further questions. CV tracing confirmed by [facility] monitor tech. Patient escort transported patient back to . Report given to LAURY Sneed
4

## 2022-10-21 ENCOUNTER — NON-APPOINTMENT (OUTPATIENT)
Age: 57
End: 2022-10-21

## 2022-10-21 ENCOUNTER — APPOINTMENT (OUTPATIENT)
Dept: ELECTROPHYSIOLOGY | Facility: CLINIC | Age: 57
End: 2022-10-21

## 2022-10-21 PROCEDURE — 93298 REM INTERROG DEV EVAL SCRMS: CPT

## 2022-10-21 PROCEDURE — G2066: CPT

## 2022-11-29 ENCOUNTER — APPOINTMENT (OUTPATIENT)
Dept: ELECTROPHYSIOLOGY | Facility: CLINIC | Age: 57
End: 2022-11-29

## 2022-11-29 ENCOUNTER — NON-APPOINTMENT (OUTPATIENT)
Age: 57
End: 2022-11-29

## 2022-11-29 PROCEDURE — G2066: CPT

## 2022-11-29 PROCEDURE — 93298 REM INTERROG DEV EVAL SCRMS: CPT

## 2022-12-30 ENCOUNTER — NON-APPOINTMENT (OUTPATIENT)
Age: 57
End: 2022-12-30

## 2022-12-30 ENCOUNTER — APPOINTMENT (OUTPATIENT)
Dept: ELECTROPHYSIOLOGY | Facility: CLINIC | Age: 57
End: 2022-12-30
Payer: COMMERCIAL

## 2022-12-30 PROCEDURE — G2066: CPT

## 2022-12-30 PROCEDURE — 93298 REM INTERROG DEV EVAL SCRMS: CPT

## 2023-02-01 ENCOUNTER — APPOINTMENT (OUTPATIENT)
Dept: NEUROLOGY | Facility: CLINIC | Age: 58
End: 2023-02-01

## 2023-02-01 ENCOUNTER — APPOINTMENT (OUTPATIENT)
Dept: NEUROLOGY | Facility: CLINIC | Age: 58
End: 2023-02-01
Payer: COMMERCIAL

## 2023-02-01 VITALS
HEART RATE: 60 BPM | DIASTOLIC BLOOD PRESSURE: 74 MMHG | TEMPERATURE: 97.8 F | SYSTOLIC BLOOD PRESSURE: 115 MMHG | BODY MASS INDEX: 30.12 KG/M2 | WEIGHT: 170 LBS | HEIGHT: 63 IN

## 2023-02-01 DIAGNOSIS — G47.33 OBSTRUCTIVE SLEEP APNEA (ADULT) (PEDIATRIC): ICD-10-CM

## 2023-02-01 PROCEDURE — 99213 OFFICE O/P EST LOW 20 MIN: CPT

## 2023-02-01 NOTE — HISTORY OF PRESENT ILLNESS
[FreeTextEntry1] : Last visit 7/25/22.\par \par he has not had any stroke like symptoms.\par He feels that he is fully recovered.\par He is compliant with aspirin 81 mg and atorvastatin 80 mg/day.\par He denies side effects.\par \par He had a home sleep study on 7/28/22.\par The AHI was 19.2.\par \par He is having a lot of difficulty using the machine.\par He has difficulty with comfort.\par He was first given a nasal mask and then a full face mask.\par He has started to sleep on his side. His wife says that this has reduced his snoring. \par Most recently he was sent an [employer] medium cushion mask but it did not seem to be compatible with his machine. He was unable to connect it.

## 2023-02-01 NOTE — DATA REVIEWED
[de-identified] : MRI brain 7/19/22:\par small acute infarct left corona radiata/basal ganglia [de-identified] : CT head, CTA head and neck, CT perfusion  7/18/22:\par 1. No acute intracranial hemorrhage.\par 2. Findings consistent with an acute to subacute infarct involving the \par left basal ganglia.\par 3. Increased attenuation appreciated along the course of the left middle \par cerebral artery M1 segment. Cannot exclude thrombus formation in this \par location. CTA findings to follow.\par \par \par TTE 7/19/22:\par \par  The aortic valve is trileaflet with thin pliable leaflets.\par  Normal appearing left atrium.\par  An interatrial septal aneurysm is noted. There is no evidence of PFO on\par  bubble study\par  The left ventricle is normal in size, wall thickness, wall motion and\par  contractility.\par  Estimated left ventricular ejection fraction is 70 %.\par  The IVC appears normal.\par  The mitral valve leaflets appear thin and normal.\par  EA reversal of the mitral inflow consistent with reduced compliance of \par the\par  left ventricle.\par  Trivial mitral regurgitation is present.\par  No evidence of pericardial effusion.\par  No evidence of pleural effusion.\par  Normal appearing pulmonic valve structure.\par  Trace pulmonic valvular regurgitation is present.\par  Normal appearing right atrium.\par  Normal appearing right ventricle structure and function.\par  The tricuspid valve leaflets are thin and pliable; valve motion is \par normal.\par  Trace tricuspid valve regurgitation is present.\par \par Home sleep study 7/28/22:\par AHI 19.2. Supien AHI 24.8 and non-supine AHI is 10.

## 2023-02-01 NOTE — DISCUSSION/SUMMARY
[FreeTextEntry1] : Mr. Strauber is a 58 year old man who was hospitalized from 7/18/22-7/21/22 with an episode of right facial droop and difficulty speaking.\par Imaging revealed an acute infarct in the left corona radiata/basal ganglia.\par \par Acute Stroke\par -Continue aspirin 81 mg/day  \par -Continue atorvastatin 80 mg.\par -Treatment of obstructive sleep apnea\par -Continue follow-up with electrophysiology to interrogate LINQ\par -Return to hospital with any stroke like symptoms.\par \par Obstructive Sleep Apnea\par -Home sleep study showed moderate LATIA with AHI of 19.2. The supine AHI was 24.8 and the non-supine AHI was 10.\par -He has been having difficulty tolerating CPAP due to discomfort with his mask.\par -Will request a mask fitting\par -Sleep in non-supine position for now\par -Weight loss.\par \par f/u 6 months, sooner if needed\par Follow-up ~ 4 months, sooner if needed.\par

## 2023-02-01 NOTE — CONSULT LETTER
[Dear  ___] : Dear  [unfilled], [Consult Letter:] : I had the pleasure of evaluating your patient, [unfilled]. [Please see my note below.] : Please see my note below. [Consult Closing:] : Thank you very much for allowing me to participate in the care of this patient.  If you have any questions, please do not hesitate to contact me. [FreeTextEntry2] : Oleg Stein [FreeTextEntry3] : Sincerely,\par \par \par Kendy Wright MD\par Diplomate, American Academy of Psychiatry and Neurology\par Board Certified in the Subspecialty of Clinical Neurophysiology\par Board Certified in the Subspecialty of Sleep Medicine\par Board Certified in the Subspecialty of Epilepsy\par

## 2023-02-03 ENCOUNTER — APPOINTMENT (OUTPATIENT)
Dept: ELECTROPHYSIOLOGY | Facility: CLINIC | Age: 58
End: 2023-02-03

## 2023-02-05 ENCOUNTER — FORM ENCOUNTER (OUTPATIENT)
Age: 58
End: 2023-02-05

## 2023-02-13 ENCOUNTER — APPOINTMENT (OUTPATIENT)
Dept: ELECTROPHYSIOLOGY | Facility: CLINIC | Age: 58
End: 2023-02-13
Payer: COMMERCIAL

## 2023-02-13 VITALS
HEIGHT: 63 IN | BODY MASS INDEX: 30.12 KG/M2 | RESPIRATION RATE: 16 BRPM | SYSTOLIC BLOOD PRESSURE: 120 MMHG | WEIGHT: 170 LBS | OXYGEN SATURATION: 97 % | DIASTOLIC BLOOD PRESSURE: 80 MMHG | HEART RATE: 60 BPM

## 2023-02-13 DIAGNOSIS — Z45.09 ENCOUNTER FOR ADJUSTMENT AND MANAGEMENT OF OTHER CARDIAC DEVICE: ICD-10-CM

## 2023-02-13 DIAGNOSIS — I63.9 CEREBRAL INFARCTION, UNSPECIFIED: ICD-10-CM

## 2023-02-13 PROCEDURE — 93291 INTERROG DEV EVAL SCRMS IP: CPT

## 2023-02-13 PROCEDURE — 99211 OFF/OP EST MAY X REQ PHY/QHP: CPT

## 2023-02-13 NOTE — ASSESSMENT
[FreeTextEntry1] : Pt presents for ILR interrogation;hx of CVA.  No at fib seen.  Will continue with remote monitoring and Return to this office in 6 months..  Pt is being treated for sleep apnea

## 2023-03-16 ENCOUNTER — NON-APPOINTMENT (OUTPATIENT)
Age: 58
End: 2023-03-16

## 2023-03-16 ENCOUNTER — APPOINTMENT (OUTPATIENT)
Dept: ELECTROPHYSIOLOGY | Facility: CLINIC | Age: 58
End: 2023-03-16
Payer: COMMERCIAL

## 2023-03-16 PROCEDURE — G2066: CPT

## 2023-03-16 PROCEDURE — 93298 REM INTERROG DEV EVAL SCRMS: CPT

## 2023-04-20 ENCOUNTER — NON-APPOINTMENT (OUTPATIENT)
Age: 58
End: 2023-04-20

## 2023-04-20 ENCOUNTER — APPOINTMENT (OUTPATIENT)
Dept: ELECTROPHYSIOLOGY | Facility: CLINIC | Age: 58
End: 2023-04-20
Payer: COMMERCIAL

## 2023-04-20 PROCEDURE — G2066: CPT

## 2023-04-20 PROCEDURE — 93298 REM INTERROG DEV EVAL SCRMS: CPT

## 2023-05-25 ENCOUNTER — APPOINTMENT (OUTPATIENT)
Dept: ELECTROPHYSIOLOGY | Facility: CLINIC | Age: 58
End: 2023-05-25
Payer: COMMERCIAL

## 2023-05-25 ENCOUNTER — NON-APPOINTMENT (OUTPATIENT)
Age: 58
End: 2023-05-25

## 2023-05-25 PROCEDURE — G2066: CPT

## 2023-05-25 PROCEDURE — 93298 REM INTERROG DEV EVAL SCRMS: CPT

## 2023-06-27 ENCOUNTER — APPOINTMENT (OUTPATIENT)
Dept: ELECTROPHYSIOLOGY | Facility: CLINIC | Age: 58
End: 2023-06-27
Payer: COMMERCIAL

## 2023-06-27 ENCOUNTER — NON-APPOINTMENT (OUTPATIENT)
Age: 58
End: 2023-06-27

## 2023-06-27 PROCEDURE — 93298 REM INTERROG DEV EVAL SCRMS: CPT

## 2023-06-27 PROCEDURE — G2066: CPT

## 2023-07-20 NOTE — SWALLOW BEDSIDE ASSESSMENT ADULT - SWALLOW EVAL: STRUCTURAL ABNORMALITIES
60 y/o M with PMH of depression, DM type 2, HTN, DVT, diabetic neuropathy, bipolar 1 disorder, obesity, CHAYITO, rheumatoid arthritis, cataract of the lt eye, and hx of excision of a testicular mass (2009), presents to the ED complaining of dizziness/pain in the knees x 3 hours. Pt states he was home this morning when he felt as though he could not get out of bed and felt severe weakness/pain. Pt notes he was making breakfast this morning when he felt like the "room was spinning" and he felt as though he was going to fall and needed to hold onto the kitchen counter. Pt explains he has never fallen before, but has a fear of falling. Pt states that pain and weakness have been going on for a few weeks now, but this morning the dizziness mixed with the pain in the knees made him worried. Pt states he is compliant with taking all medications and took his morning medications. Pt notes he has been trying to cut out chinese food, pizza, and soda recently. Pt reports that he knows he needs a home health aid at home because he has been struggling to take care of his home or even walk up the two steps by his home. Pt denies any chest pain, numbness/tingling of the extremities, SOB, fever, body aches, chills, or any other acute complaints. none present

## 2023-08-14 ENCOUNTER — APPOINTMENT (OUTPATIENT)
Dept: ELECTROPHYSIOLOGY | Facility: CLINIC | Age: 58
End: 2023-08-14

## 2023-09-13 ENCOUNTER — NON-APPOINTMENT (OUTPATIENT)
Age: 58
End: 2023-09-13

## 2023-09-17 ENCOUNTER — APPOINTMENT (OUTPATIENT)
Dept: ELECTROPHYSIOLOGY | Facility: CLINIC | Age: 58
End: 2023-09-17
Payer: COMMERCIAL

## 2023-09-18 ENCOUNTER — NON-APPOINTMENT (OUTPATIENT)
Age: 58
End: 2023-09-18

## 2023-09-18 PROCEDURE — 93298 REM INTERROG DEV EVAL SCRMS: CPT

## 2023-09-18 PROCEDURE — G2066: CPT

## 2023-10-23 ENCOUNTER — NON-APPOINTMENT (OUTPATIENT)
Age: 58
End: 2023-10-23

## 2023-10-23 ENCOUNTER — APPOINTMENT (OUTPATIENT)
Dept: ELECTROPHYSIOLOGY | Facility: CLINIC | Age: 58
End: 2023-10-23
Payer: COMMERCIAL

## 2023-10-23 PROCEDURE — G2066: CPT

## 2023-10-23 PROCEDURE — 93298 REM INTERROG DEV EVAL SCRMS: CPT | Mod: NC

## 2023-11-27 ENCOUNTER — APPOINTMENT (OUTPATIENT)
Dept: ELECTROPHYSIOLOGY | Facility: CLINIC | Age: 58
End: 2023-11-27
Payer: COMMERCIAL

## 2023-11-27 ENCOUNTER — NON-APPOINTMENT (OUTPATIENT)
Age: 58
End: 2023-11-27

## 2023-11-27 PROCEDURE — G2066: CPT | Mod: NC

## 2023-11-27 PROCEDURE — 93298 REM INTERROG DEV EVAL SCRMS: CPT | Mod: NC

## 2023-12-29 ENCOUNTER — APPOINTMENT (OUTPATIENT)
Dept: ELECTROPHYSIOLOGY | Facility: CLINIC | Age: 58
End: 2023-12-29
Payer: COMMERCIAL

## 2023-12-29 ENCOUNTER — NON-APPOINTMENT (OUTPATIENT)
Age: 58
End: 2023-12-29

## 2023-12-29 PROCEDURE — G2066: CPT

## 2023-12-29 PROCEDURE — 93298 REM INTERROG DEV EVAL SCRMS: CPT

## 2024-01-18 NOTE — DISCUSSION/SUMMARY
[FreeTextEntry1] : Mr. Strauber is a 58 year old man who was hospitalized from 7/18/22-7/21/22 with an episode of right facial droop and difficulty speaking.  Imaging revealed an acute infarct in the left corona radiata/basal ganglia.    Acute Stroke  -Continue aspirin 81 mg/day  -Continue atorvastatin 80 mg.  -Treatment of obstructive sleep apnea  -Continue follow-up with electrophysiology to interrogate LINQ  -Return to hospital with any stroke like symptoms.    Obstructive Sleep Apnea  -Home sleep study showed moderate LATIA with AHI of 19.2. The supine AHI was 24.8 and the non-supine AHI was 10.  -He has been having difficulty tolerating CPAP due to discomfort with his mask.  -Will request a mask fitting  -Sleep in non-supine position for now  -Weight loss.    f/u 6 months, sooner if needed  Follow-up ~ 4 months, sooner if needed.

## 2024-01-18 NOTE — DATA REVIEWED
[de-identified] : MRI brain 7/19/22:\par  small acute infarct left corona radiata/basal ganglia [de-identified] : CT head, CTA head and neck, CT perfusion  7/18/22:\par  1. No acute intracranial hemorrhage.\par  2. Findings consistent with an acute to subacute infarct involving the \par  left basal ganglia.\par  3. Increased attenuation appreciated along the course of the left middle \par  cerebral artery M1 segment. Cannot exclude thrombus formation in this \par  location. CTA findings to follow.\par  \par  \par  TTE 7/19/22:\par  \par   The aortic valve is trileaflet with thin pliable leaflets.\par   Normal appearing left atrium.\par   An interatrial septal aneurysm is noted. There is no evidence of PFO on\par   bubble study\par   The left ventricle is normal in size, wall thickness, wall motion and\par   contractility.\par   Estimated left ventricular ejection fraction is 70 %.\par   The IVC appears normal.\par   The mitral valve leaflets appear thin and normal.\par   EA reversal of the mitral inflow consistent with reduced compliance of \par  the\par   left ventricle.\par   Trivial mitral regurgitation is present.\par   No evidence of pericardial effusion.\par   No evidence of pleural effusion.\par   Normal appearing pulmonic valve structure.\par   Trace pulmonic valvular regurgitation is present.\par   Normal appearing right atrium.\par   Normal appearing right ventricle structure and function.\par   The tricuspid valve leaflets are thin and pliable; valve motion is \par  normal.\par   Trace tricuspid valve regurgitation is present.\par  \par  Home sleep study 7/28/22:\par  AHI 19.2. Supien AHI 24.8 and non-supine AHI is 10.

## 2024-01-30 ENCOUNTER — NON-APPOINTMENT (OUTPATIENT)
Age: 59
End: 2024-01-30

## 2024-01-30 ENCOUNTER — APPOINTMENT (OUTPATIENT)
Dept: ELECTROPHYSIOLOGY | Facility: CLINIC | Age: 59
End: 2024-01-30
Payer: COMMERCIAL

## 2024-01-30 PROCEDURE — 93298 REM INTERROG DEV EVAL SCRMS: CPT

## 2024-03-05 ENCOUNTER — APPOINTMENT (OUTPATIENT)
Dept: ELECTROPHYSIOLOGY | Facility: CLINIC | Age: 59
End: 2024-03-05
Payer: COMMERCIAL

## 2024-03-05 ENCOUNTER — NON-APPOINTMENT (OUTPATIENT)
Age: 59
End: 2024-03-05

## 2024-03-05 PROCEDURE — 93298 REM INTERROG DEV EVAL SCRMS: CPT

## 2024-03-17 ENCOUNTER — NON-APPOINTMENT (OUTPATIENT)
Age: 59
End: 2024-03-17

## 2024-04-05 ENCOUNTER — APPOINTMENT (OUTPATIENT)
Dept: ELECTROPHYSIOLOGY | Facility: CLINIC | Age: 59
End: 2024-04-05
Payer: COMMERCIAL

## 2024-04-05 ENCOUNTER — NON-APPOINTMENT (OUTPATIENT)
Age: 59
End: 2024-04-05

## 2024-04-05 PROCEDURE — 93298 REM INTERROG DEV EVAL SCRMS: CPT

## 2024-05-06 ENCOUNTER — NON-APPOINTMENT (OUTPATIENT)
Age: 59
End: 2024-05-06

## 2024-05-06 ENCOUNTER — APPOINTMENT (OUTPATIENT)
Dept: ELECTROPHYSIOLOGY | Facility: CLINIC | Age: 59
End: 2024-05-06
Payer: COMMERCIAL

## 2024-05-06 PROCEDURE — 93298 REM INTERROG DEV EVAL SCRMS: CPT

## 2024-06-10 ENCOUNTER — APPOINTMENT (OUTPATIENT)
Dept: ELECTROPHYSIOLOGY | Facility: CLINIC | Age: 59
End: 2024-06-10
Payer: COMMERCIAL

## 2024-06-10 ENCOUNTER — NON-APPOINTMENT (OUTPATIENT)
Age: 59
End: 2024-06-10

## 2024-06-10 PROCEDURE — 93298 REM INTERROG DEV EVAL SCRMS: CPT

## 2024-07-14 ENCOUNTER — APPOINTMENT (OUTPATIENT)
Dept: ELECTROPHYSIOLOGY | Facility: CLINIC | Age: 59
End: 2024-07-14
Payer: COMMERCIAL

## 2024-07-15 ENCOUNTER — NON-APPOINTMENT (OUTPATIENT)
Age: 59
End: 2024-07-15

## 2024-07-15 PROCEDURE — 93298 REM INTERROG DEV EVAL SCRMS: CPT

## 2024-08-14 ENCOUNTER — APPOINTMENT (OUTPATIENT)
Dept: NEUROLOGY | Facility: CLINIC | Age: 59
End: 2024-08-14

## 2024-08-19 ENCOUNTER — APPOINTMENT (OUTPATIENT)
Dept: ELECTROPHYSIOLOGY | Facility: CLINIC | Age: 59
End: 2024-08-19
Payer: COMMERCIAL

## 2024-08-19 ENCOUNTER — NON-APPOINTMENT (OUTPATIENT)
Age: 59
End: 2024-08-19

## 2024-08-19 PROCEDURE — 93298 REM INTERROG DEV EVAL SCRMS: CPT

## 2024-09-23 ENCOUNTER — APPOINTMENT (OUTPATIENT)
Dept: ELECTROPHYSIOLOGY | Facility: CLINIC | Age: 59
End: 2024-09-23
Payer: COMMERCIAL

## 2024-09-23 ENCOUNTER — NON-APPOINTMENT (OUTPATIENT)
Age: 59
End: 2024-09-23

## 2024-09-23 PROCEDURE — 93298 REM INTERROG DEV EVAL SCRMS: CPT

## 2024-10-28 ENCOUNTER — NON-APPOINTMENT (OUTPATIENT)
Age: 59
End: 2024-10-28

## 2024-10-28 ENCOUNTER — APPOINTMENT (OUTPATIENT)
Dept: ELECTROPHYSIOLOGY | Facility: CLINIC | Age: 59
End: 2024-10-28
Payer: COMMERCIAL

## 2024-10-28 PROCEDURE — 93298 REM INTERROG DEV EVAL SCRMS: CPT

## 2024-12-02 ENCOUNTER — APPOINTMENT (OUTPATIENT)
Dept: ELECTROPHYSIOLOGY | Facility: CLINIC | Age: 59
End: 2024-12-02
Payer: COMMERCIAL

## 2024-12-02 ENCOUNTER — NON-APPOINTMENT (OUTPATIENT)
Age: 59
End: 2024-12-02

## 2024-12-02 PROCEDURE — 93298 REM INTERROG DEV EVAL SCRMS: CPT

## 2024-12-30 NOTE — PROGRESS NOTE ADULT - SUBJECTIVE AND OBJECTIVE BOX
----- Message from Althea Montes De Oca MD sent at 12/30/2024  3:52 PM CST -----  Pt has appt next week.  Repeat kidney function back to his normal. MAT   Interval History:  7/21/22: No new complaints.    MEDICATIONS  (STANDING):  aspirin enteric coated 81 milliGRAM(s) Oral daily  atorvastatin 80 milliGRAM(s) Oral at bedtime  clopidogrel Tablet 75 milliGRAM(s) Oral daily  sodium chloride 0.9%. 1000 milliLiter(s) (75 mL/Hr) IV Continuous <Continuous>    MEDICATIONS  (PRN):  acetaminophen     Tablet .. 650 milliGRAM(s) Oral every 6 hours PRN Temp greater or equal to 38C (100.4F), Mild Pain (1 - 3)  aluminum hydroxide/magnesium hydroxide/simethicone Suspension 30 milliLiter(s) Oral every 4 hours PRN Dyspepsia  melatonin 3 milliGRAM(s) Oral at bedtime PRN Insomnia  ondansetron Injectable 4 milliGRAM(s) IV Push every 8 hours PRN Nausea and/or Vomiting      Allergies    No Known Allergies    Intolerances        PHYSICAL EXAM:  Vital Signs Last 24 Hrs  T(F): 97.3 (07-21-22 @ 09:40)  HR: 57 (07-20-22 @ 23:12)  BP: 101/71 (07-21-22 @ 09:40)  RR: 16 (07-21-22 @ 09:40)    GENERAL: NAD, well-groomed, well-developed  HEAD:  Atraumatic, Normocephalic  Neuro:  Awake, alert, no aphasia  CN: PERRL, EOMI, no nystagmus, no facial weakness, tongue protrudes in the midline  motor: normal tone, no pronator drift, full strength in all four extremities  sensory: intact to light touch  coordination: finger to nose intact bilaterally      LABS:                        15.1   6.24  )-----------( 194      ( 21 Jul 2022 07:18 )             43.4     07-21    141  |  114<H>  |  19  ----------------------------<  94  3.9   |  24  |  1.34<H>    Ca    9.0      21 Jul 2022 07:18            RADIOLOGY & ADDITIONAL STUDIES:  MR Head No Cont (07.19.22 @ 18:17)  FINDINGS:  There is no abnormal T2 prolongation signal in the white matter.    There is no acute parenchymal hemorrhage, parenchymal mass, mass effect   or midline shift. There is no extra-axial fluid collection.  There is no   hydrocephalus.    Acute infarct left corona radiata/basal ganglia measuring 1.5 x 1.3 cm.     CT Brain Stroke protocol:  CT BRAIN WITHOUT CONTRAST:  1. No acute intracranial hemorrhage.  2. Findings consistent with an acute to subacute infarct involving the   left basal ganglia.  3. Increased attenuation appreciated along the course of the left middle   cerebral artery M1 segment. Cannot exclude thrombus formation in this   location. CTA findings to follow.    CT PERFUSION demonstrated: No core infarction. No active ischemia   determined using the conventional parameter of Tmax greater than 6s.   However, when using the more sensitive but less accurate Tmax >4s, there   is 97 cc of brain met risk involving the right PCA and bilateral MCA   territories.    CTA COW:  Patent intracranial circulation without flow limiting stenosis.   Specifically, no thrombus formation appreciated within the M1 segment of   the left middle cerebral artery.. Hypoplastic right M1 segment.   Hypoplastic left P1 segments with a prominent left posterior   communicating artery which also contributes to flow within the left   posterior cerebral artery.    CT Angio Neck Stroke Protocol w/ IV Cont (07.18.22 @ 18:59)    Left: The left common carotid artery emanates from the aortic arch. The   left common carotid artery is normal in course and caliber to the carotid   bifurcation. Origin of the left internal carotid artery is are   unremarkable based on NASCET criteria. The left internal carotid artery   has normal course and caliber to the intracranial circulation.    Right:  The right common carotid artery emanates from the brachiocephalic   artery. The right common carotid artery is normal in course and caliber   to the carotid bifurcation. Origins of the right internal carotid artery   is unremarkable based on NASCET criteria. The right internal carotid   artery has normal course and caliber to the intracranial circulation.    TTE Echo Complete w/o Contrast w/ Doppler (07.19.22 @ 14:11)    An interatrial septal aneurysm is noted. There is no evidence of PFO on   bubble study   The left ventricle is normal in size, wall thickness, wall motion and   contractility.   Estimated left ventricular ejection fraction is 70 %.   The IVC appears normal.

## 2025-01-06 ENCOUNTER — APPOINTMENT (OUTPATIENT)
Dept: ELECTROPHYSIOLOGY | Facility: CLINIC | Age: 60
End: 2025-01-06
Payer: COMMERCIAL

## 2025-01-06 ENCOUNTER — NON-APPOINTMENT (OUTPATIENT)
Age: 60
End: 2025-01-06

## 2025-01-06 PROCEDURE — 93298 REM INTERROG DEV EVAL SCRMS: CPT

## 2025-01-13 NOTE — ED ADULT NURSE NOTE - PAIN RATING/NUMBER SCALE (0-10): REST
CRISTEL to reschedule her 2/24 appointment with Dr. Andersen since he will not be in the office.  
3

## 2025-02-10 ENCOUNTER — APPOINTMENT (OUTPATIENT)
Dept: ELECTROPHYSIOLOGY | Facility: CLINIC | Age: 60
End: 2025-02-10
Payer: COMMERCIAL

## 2025-02-10 ENCOUNTER — NON-APPOINTMENT (OUTPATIENT)
Age: 60
End: 2025-02-10

## 2025-02-10 PROCEDURE — 93298 REM INTERROG DEV EVAL SCRMS: CPT

## 2025-03-17 ENCOUNTER — NON-APPOINTMENT (OUTPATIENT)
Age: 60
End: 2025-03-17

## 2025-03-17 ENCOUNTER — APPOINTMENT (OUTPATIENT)
Dept: ELECTROPHYSIOLOGY | Facility: CLINIC | Age: 60
End: 2025-03-17
Payer: COMMERCIAL

## 2025-03-17 PROCEDURE — 93298 REM INTERROG DEV EVAL SCRMS: CPT

## 2025-04-21 ENCOUNTER — NON-APPOINTMENT (OUTPATIENT)
Age: 60
End: 2025-04-21

## 2025-04-21 ENCOUNTER — APPOINTMENT (OUTPATIENT)
Dept: ELECTROPHYSIOLOGY | Facility: CLINIC | Age: 60
End: 2025-04-21
Payer: COMMERCIAL

## 2025-04-21 PROCEDURE — 93298 REM INTERROG DEV EVAL SCRMS: CPT

## 2025-05-22 ENCOUNTER — APPOINTMENT (OUTPATIENT)
Dept: ELECTROPHYSIOLOGY | Facility: CLINIC | Age: 60
End: 2025-05-22
Payer: COMMERCIAL

## 2025-05-22 ENCOUNTER — NON-APPOINTMENT (OUTPATIENT)
Age: 60
End: 2025-05-22

## 2025-05-22 PROCEDURE — 93298 REM INTERROG DEV EVAL SCRMS: CPT

## 2025-06-26 ENCOUNTER — APPOINTMENT (OUTPATIENT)
Dept: ELECTROPHYSIOLOGY | Facility: CLINIC | Age: 60
End: 2025-06-26
Payer: COMMERCIAL

## 2025-06-26 ENCOUNTER — NON-APPOINTMENT (OUTPATIENT)
Age: 60
End: 2025-06-26

## 2025-06-26 PROCEDURE — 93298 REM INTERROG DEV EVAL SCRMS: CPT

## 2025-07-30 ENCOUNTER — APPOINTMENT (OUTPATIENT)
Dept: ELECTROPHYSIOLOGY | Facility: CLINIC | Age: 60
End: 2025-07-30
Payer: COMMERCIAL

## 2025-07-30 ENCOUNTER — NON-APPOINTMENT (OUTPATIENT)
Age: 60
End: 2025-07-30

## 2025-07-30 PROCEDURE — 93298 REM INTERROG DEV EVAL SCRMS: CPT

## 2025-09-03 ENCOUNTER — APPOINTMENT (OUTPATIENT)
Dept: ELECTROPHYSIOLOGY | Facility: CLINIC | Age: 60
End: 2025-09-03
Payer: COMMERCIAL

## 2025-09-03 ENCOUNTER — NON-APPOINTMENT (OUTPATIENT)
Age: 60
End: 2025-09-03

## 2025-09-03 PROCEDURE — 93298 REM INTERROG DEV EVAL SCRMS: CPT
